# Patient Record
Sex: FEMALE | Race: WHITE | NOT HISPANIC OR LATINO | URBAN - METROPOLITAN AREA
[De-identification: names, ages, dates, MRNs, and addresses within clinical notes are randomized per-mention and may not be internally consistent; named-entity substitution may affect disease eponyms.]

---

## 2017-01-26 ENCOUNTER — INPATIENT (INPATIENT)
Facility: HOSPITAL | Age: 82
LOS: 1 days | Discharge: HOME | DRG: 247 | End: 2017-01-28
Attending: INTERNAL MEDICINE | Admitting: INTERNAL MEDICINE
Payer: MEDICARE

## 2017-01-26 VITALS
SYSTOLIC BLOOD PRESSURE: 134 MMHG | DIASTOLIC BLOOD PRESSURE: 75 MMHG | HEIGHT: 66 IN | WEIGHT: 169.98 LBS | RESPIRATION RATE: 18 BRPM | HEART RATE: 80 BPM | TEMPERATURE: 98 F | OXYGEN SATURATION: 99 %

## 2017-01-26 DIAGNOSIS — I21.11 ST ELEVATION (STEMI) MYOCARDIAL INFARCTION INVOLVING RIGHT CORONARY ARTERY: ICD-10-CM

## 2017-01-26 DIAGNOSIS — K21.9 GASTRO-ESOPHAGEAL REFLUX DISEASE WITHOUT ESOPHAGITIS: ICD-10-CM

## 2017-01-26 DIAGNOSIS — Z96.659 PRESENCE OF UNSPECIFIED ARTIFICIAL KNEE JOINT: Chronic | ICD-10-CM

## 2017-01-26 DIAGNOSIS — I21.3 ST ELEVATION (STEMI) MYOCARDIAL INFARCTION OF UNSPECIFIED SITE: ICD-10-CM

## 2017-01-26 LAB
ALBUMIN SERPL ELPH-MCNC: 4.3 G/DL — SIGNIFICANT CHANGE UP (ref 3.3–5.2)
ALP SERPL-CCNC: 52 U/L — SIGNIFICANT CHANGE UP (ref 40–120)
ALT FLD-CCNC: 18 U/L — SIGNIFICANT CHANGE UP
ANION GAP SERPL CALC-SCNC: 14 MMOL/L — SIGNIFICANT CHANGE UP (ref 5–17)
APTT BLD: 30.6 SEC — SIGNIFICANT CHANGE UP (ref 27.5–37.4)
AST SERPL-CCNC: 24 U/L — SIGNIFICANT CHANGE UP
BASOPHILS # BLD AUTO: 0 K/UL — SIGNIFICANT CHANGE UP (ref 0–0.2)
BASOPHILS NFR BLD AUTO: 0.4 % — SIGNIFICANT CHANGE UP (ref 0–2)
BILIRUB SERPL-MCNC: 0.6 MG/DL — SIGNIFICANT CHANGE UP (ref 0.4–2)
BLD GP AB SCN SERPL QL: SIGNIFICANT CHANGE UP
BUN SERPL-MCNC: 22 MG/DL — HIGH (ref 8–20)
CALCIUM SERPL-MCNC: 9.4 MG/DL — SIGNIFICANT CHANGE UP (ref 8.6–10.2)
CHLORIDE SERPL-SCNC: 102 MMOL/L — SIGNIFICANT CHANGE UP (ref 98–107)
CO2 SERPL-SCNC: 24 MMOL/L — SIGNIFICANT CHANGE UP (ref 22–29)
CREAT SERPL-MCNC: 0.98 MG/DL — SIGNIFICANT CHANGE UP (ref 0.5–1.3)
EOSINOPHIL # BLD AUTO: 0 K/UL — SIGNIFICANT CHANGE UP (ref 0–0.5)
EOSINOPHIL NFR BLD AUTO: 0.5 % — SIGNIFICANT CHANGE UP (ref 0–6)
GLUCOSE SERPL-MCNC: 134 MG/DL — HIGH (ref 70–115)
HCT VFR BLD CALC: 40.9 % — SIGNIFICANT CHANGE UP (ref 37–47)
HGB BLD-MCNC: 14 G/DL — SIGNIFICANT CHANGE UP (ref 12–16)
INR BLD: 1.13 RATIO — SIGNIFICANT CHANGE UP (ref 0.88–1.16)
LYMPHOCYTES # BLD AUTO: 1.6 K/UL — SIGNIFICANT CHANGE UP (ref 1–4.8)
LYMPHOCYTES # BLD AUTO: 19.8 % — LOW (ref 20–55)
MCHC RBC-ENTMCNC: 33.2 PG — HIGH (ref 27–31)
MCHC RBC-ENTMCNC: 34.2 G/DL — SIGNIFICANT CHANGE UP (ref 32–36)
MCV RBC AUTO: 96.9 FL — SIGNIFICANT CHANGE UP (ref 81–99)
MONOCYTES # BLD AUTO: 0.5 K/UL — SIGNIFICANT CHANGE UP (ref 0–0.8)
MONOCYTES NFR BLD AUTO: 6.5 % — SIGNIFICANT CHANGE UP (ref 3–10)
NEUTROPHILS # BLD AUTO: 5.7 K/UL — SIGNIFICANT CHANGE UP (ref 1.8–8)
NEUTROPHILS NFR BLD AUTO: 72.7 % — SIGNIFICANT CHANGE UP (ref 37–73)
PLATELET # BLD AUTO: 273 K/UL — SIGNIFICANT CHANGE UP (ref 150–400)
POTASSIUM SERPL-MCNC: 4.2 MMOL/L — SIGNIFICANT CHANGE UP (ref 3.5–5.3)
POTASSIUM SERPL-SCNC: 4.2 MMOL/L — SIGNIFICANT CHANGE UP (ref 3.5–5.3)
PROT SERPL-MCNC: 7.4 G/DL — SIGNIFICANT CHANGE UP (ref 6.6–8.7)
PROTHROM AB SERPL-ACNC: 12.4 SEC — SIGNIFICANT CHANGE UP (ref 10–13.1)
RBC # BLD: 4.22 M/UL — LOW (ref 4.4–5.2)
RBC # FLD: 13.3 % — SIGNIFICANT CHANGE UP (ref 11–15.6)
SODIUM SERPL-SCNC: 140 MMOL/L — SIGNIFICANT CHANGE UP (ref 135–145)
TROPONIN T SERPL-MCNC: 0.2 NG/ML — CRITICAL HIGH (ref 0–0.06)
TYPE + AB SCN PNL BLD: SIGNIFICANT CHANGE UP
WBC # BLD: 7.88 K/UL — SIGNIFICANT CHANGE UP (ref 4.8–10.8)
WBC # FLD AUTO: 7.88 K/UL — SIGNIFICANT CHANGE UP (ref 4.8–10.8)

## 2017-01-26 PROCEDURE — 99291 CRITICAL CARE FIRST HOUR: CPT

## 2017-01-26 PROCEDURE — 93010 ELECTROCARDIOGRAM REPORT: CPT

## 2017-01-26 PROCEDURE — 71010: CPT | Mod: 26

## 2017-01-26 RX ORDER — ASPIRIN/CALCIUM CARB/MAGNESIUM 324 MG
81 TABLET ORAL DAILY
Qty: 0 | Refills: 0 | Status: DISCONTINUED | OUTPATIENT
Start: 2017-01-26 | End: 2017-01-28

## 2017-01-26 RX ORDER — ATORVASTATIN CALCIUM 80 MG/1
40 TABLET, FILM COATED ORAL AT BEDTIME
Qty: 0 | Refills: 0 | Status: DISCONTINUED | OUTPATIENT
Start: 2017-01-26 | End: 2017-01-28

## 2017-01-26 RX ORDER — TICAGRELOR 90 MG/1
90 TABLET ORAL
Qty: 0 | Refills: 0 | Status: DISCONTINUED | OUTPATIENT
Start: 2017-01-27 | End: 2017-01-28

## 2017-01-26 RX ORDER — PANTOPRAZOLE SODIUM 20 MG/1
40 TABLET, DELAYED RELEASE ORAL
Qty: 0 | Refills: 0 | Status: DISCONTINUED | OUTPATIENT
Start: 2017-01-26 | End: 2017-01-28

## 2017-01-26 RX ORDER — METOPROLOL TARTRATE 50 MG
25 TABLET ORAL
Qty: 0 | Refills: 0 | Status: DISCONTINUED | OUTPATIENT
Start: 2017-01-26 | End: 2017-01-28

## 2017-01-26 RX ORDER — ONDANSETRON 8 MG/1
4 TABLET, FILM COATED ORAL ONCE
Qty: 0 | Refills: 0 | Status: COMPLETED | OUTPATIENT
Start: 2017-01-26 | End: 2017-01-26

## 2017-01-26 RX ADMIN — Medication 25 MILLIGRAM(S): at 18:41

## 2017-01-26 RX ADMIN — ONDANSETRON 4 MILLIGRAM(S): 8 TABLET, FILM COATED ORAL at 17:02

## 2017-01-26 RX ADMIN — Medication 81 MILLIGRAM(S): at 18:41

## 2017-01-26 RX ADMIN — ATORVASTATIN CALCIUM 40 MILLIGRAM(S): 80 TABLET, FILM COATED ORAL at 22:37

## 2017-01-26 RX ADMIN — PANTOPRAZOLE SODIUM 40 MILLIGRAM(S): 20 TABLET, DELAYED RELEASE ORAL at 18:41

## 2017-01-26 NOTE — H&P ADULT. - RS GEN PE MLT RESP DETAILS PC
clear to auscultation bilaterally/diminished breath sounds, R/no wheezes/diminished breath sounds, L

## 2017-01-26 NOTE — ED PROVIDER NOTE - CRITICAL CARE PROVIDED
documentation/interpretation of diagnostic studies/consultation with other physicians/direct patient care (not related to procedure)/additional history taking

## 2017-01-26 NOTE — H&P CARDIOLOGY - HISTORY OF PRESENT ILLNESS
This is an 82 y/o female with no significant medical history whom c/o chest pain that began at 930 this am.  She eventually went to an urgent care center because of the pain who sent her to [facility] ER.  Upon arrival patient EKG revealed IWMI and patient brought up to CCL for emergent LHC and possible PCI.    PMD--Dr. José

## 2017-01-26 NOTE — ED ADULT NURSE REASSESSMENT NOTE - NS ED NURSE REASSESS COMMENT FT1
dr whiting at bedside pt aa and o x3 c/o pain to chest x few days, per ems pt given asa and 2 nitro at walkin clinic
pt transported to cardiac cath at this time, a/o x 3, b/l 20 g in b/l in AC's, MD and RN's accompanying patient, vitals are as charted, belongings with patient

## 2017-01-26 NOTE — H&P ADULT. - HISTORY OF PRESENT ILLNESS
81F with history of GERD. Brought to ER with chest pain since this morning. FOund to have Inferior wall EKG changes. Brought to the cardiac cath lab for urgent cath, which revealed an occluded RCA. This was treated with a DEONDRE x 1. The EF was noted to be about 45% with inferior wall akinesis.

## 2017-01-26 NOTE — DISCHARGE NOTE ADULT - MEDICATION SUMMARY - MEDICATIONS TO TAKE
I will START or STAY ON the medications listed below when I get home from the hospital:    aspirin 81 mg oral tablet, chewable  -- 1 tab(s) by mouth once a day  -- Indication: For CAD S/P percutaneous coronary angioplasty    atorvastatin 40 mg oral tablet  -- 1 tab(s) by mouth once a day (at bedtime)  -- Indication: For CAD S/P percutaneous coronary angioplasty    ticagrelor 90 mg oral tablet  -- 1 tab(s) by mouth 2 times a day  -- Indication: For CAD S/P percutaneous coronary angioplasty    metoprolol tartrate 25 mg oral tablet  -- 1 tab(s) by mouth 2 times a day  -- Indication: For CAD S/P percutaneous coronary angioplasty    omeprazole 20 mg oral delayed release capsule  -- 1 cap(s) by mouth once a day  -- Indication: For Gastroesophageal reflux disease, esophagitis presence not specified

## 2017-01-26 NOTE — H&P ADULT. - ASSESSMENT
Impression/Plan:    1) Inferior wall MI, involving the RCA: S/P PCI to RCA. Will start ASA/Brilinta, as well as Statin, Beta blocker and ACE. WIll Check Troponin and F/U Echo. Cardiology F/U     2) GERD: WIll start Diet and give protonix     I spent a  total of 35 mins evaluating and treating this critical patient

## 2017-01-26 NOTE — DISCHARGE NOTE ADULT - CARE PROVIDER_API CALL
Triston Mejia), Cardiovascular Disease; Interventional Cardiology  [address] [place] [address]  Phone: [phone]  Fax: [phone] Triston Mejia), Cardiovascular Disease; Interventional Cardiology  [address] [place] [address]  Phone: [phone]  Fax: [phone]    simin MCNAIR,   Phone: (   )    -  Fax: (   )    -

## 2017-01-26 NOTE — DISCHARGE NOTE ADULT - NS AS ACTIVITY OBS
No Heavy lifting/straining/Stairs allowed/Walking-Outdoors allowed/Showering allowed/Walking-Indoors allowed

## 2017-01-26 NOTE — ED PROVIDER NOTE - CONSTITUTIONAL, MLM
normal... , awake, alert, oriented to person, place, time/situation , + diaphoresis, in mild distress

## 2017-01-26 NOTE — PROGRESS NOTE ADULT - SUBJECTIVE AND OBJECTIVE BOX
Cardiology NP note:     -See H & P    -s/p STEMI/IWMI and DEONDRE x1 to dRCA; EF ~ 45% with inferior hypokinesis  -RFA angioseal site benign without hematoma/bleeding; no bruit; + right PP  -Post PCI EKG: NSR 84 bpm inferior ST elevations improving  -/77 Tele HR 83 RR 12 Pox 98% O2 2LNC    A/P: 82 y/o female with significant past medical history who came to ER today c/o chest pain--STEMI/IWMI now s/p DEONDRE x 1 to dRCA with inferior hypokinesis on cath.  -Admit to ICU--Dr. Hutchinson service  -Meds: ADD baby ASA/brilinta/beta blocker/statin             Continue PPI for GI prohylaxis             Consider adding ACEI if BP remains stable  -Benefits of ASA/Brilinta emphasized  -Lifestyle mods/diet/activity/meds discussed with patient verbal understanding  -Additonal plan as per Attending MD and ICU staff

## 2017-01-26 NOTE — ED PROVIDER NOTE - MEDICAL DECISION MAKING DETAILS
spoke with interventional cardiology sagree to emergenct cath she has asa and nitro at urgent [facility] center. pt pain controlled multip bedside hemodynamic reassessments. do not suspect aortic dissection. admitted to cath lab for emergent cath for acute MI

## 2017-01-26 NOTE — DISCHARGE NOTE ADULT - PROVIDER TOKENS
AIRAM:'[id number]' TOKEN:'[id number]',FREE:[LAST:[simin PMD],PHONE:[(   )    -],FAX:[(   )    -]]

## 2017-01-26 NOTE — H&P ADULT. - ATTENDING COMMENTS
I saw this patient at 0910 on 1/27 and agree with the above.  Her chest discomfort ("not pain!") is gone, denies dyspnea.  Hemodynamics are stable.  Her cath site in the R inguinal area is intact.  OK for transfer from ICU.

## 2017-01-26 NOTE — DISCHARGE NOTE ADULT - PLAN OF CARE
Remain free of worsening CAD No heavy lifting, driving, sex, tub baths, swimming, or any activity that submerges the lower half of the body in water for 48 hours.  Limited walking and stairs for 48 hours.    Change the bandaid after 24 hours and every 24 hours after that.  Keep the puncture site dry and covered with a bandaid until a scab forms.    Observe the site frequently.  If bleeding or a large lump (the size of a golf ball or bigger) occurs lie flat, apply continuous direct pressure just above the puncture site for at least 10 minutes, and notify your physician immediately.  If the bleeding cannot be controlled, call 911 immediately for assistance.  Notify your physician of pain, swelling or any drainage.    Notify your physician immediately if coldness, numbness, discoloration or pain in your foot occurs.  Follow up with Dr. Mejia in one to two weeks.

## 2017-01-26 NOTE — CONSULT NOTE ADULT - SUBJECTIVE AND OBJECTIVE BOX
Patient is a 81y old  Female who presents with a chief complaint of Chest Pain (2017 17:25)      HPI:  81F with history of GERD. Brought to ER with chest pain since this morning. Found to have acute Inferior wall MI.  Chest pain intermittent for the past 2 weeks.  Today chest pain started at 9AM.      PAST MEDICAL & SURGICAL HISTORY:  GERD (gastroesophageal reflux disease)  Osteoporosis  S/P knee replacement: left    Allergies    Duricef (Unknown)    Intolerances        MEDICATIONS  (STANDING):  ondansetron Injectable 4milliGRAM(s) IV Push once  aspirin  chewable 81milliGRAM(s) Chew daily  atorvastatin 40milliGRAM(s) Oral at bedtime  metoprolol 25milliGRAM(s) Oral two times a day  pantoprazole    Tablet 40milliGRAM(s) Oral before breakfast    MEDICATIONS  (PRN):    ondansetron Injectable 4milliGRAM(s) IV Push once  aspirin  chewable 81milliGRAM(s) Chew daily  atorvastatin 40milliGRAM(s) Oral at bedtime  metoprolol 25milliGRAM(s) Oral two times a day  pantoprazole    Tablet 40milliGRAM(s) Oral before breakfast      FAMILY HISTORY:  Family history of stomach cancer (Father, Mother)  CAD (coronary artery disease) (Sibling)      SOCIAL HISTORY:    CIGARETTES: None    ALCOHOL: None    REVIEW OF SYSTEMS:  CONSTITUTIONAL: No fever, weight loss, or fatigue  EYES: No eye pain, visual disturbances, or discharge  ENMT:  No difficulty hearing, tinnitus, vertigo; No sinus or throat pain  NECK: No pain or stiffness  RESPIRATORY: No cough, wheezing, chills or hemoptysis; No Shortness of Breath  CARDIOVASCULAR: No chest pain, palpitations, passing out, dizziness, or leg swelling  GASTROINTESTINAL: No abdominal or epigastric pain. No nausea, vomiting, or hematemesis; No diarrhea or constipation. No melena or hematochezia.  GENITOURINARY: No dysuria, frequency, hematuria, or incontinence  NEUROLOGICAL: No headaches, memory loss, loss of strength, numbness, or tremors  SKIN: No itching, burning, rashes, or lesions   LYMPH Nodes: No enlarged glands  ENDOCRINE: No heat or cold intolerance; No hair loss  MUSCULOSKELETAL: No joint pain or swelling; No muscle, back, or extremity pain  PSYCHIATRIC: No depression, anxiety, mood swings, or difficulty sleeping  HEME/LYMPH: No easy bruising, or bleeding gums  ALLERY AND IMMUNOLOGIC: No hives or eczema	    Vital Signs Last 24 Hrs  T(C): 36, Max: 36.6 ( @ 16:01)  T(F): 96.8, Max: 97.8 ( @ 16:01)  HR: 90 (74 - 90)  BP: 128/90 (128/90 - 143/62)  BP(mean): 102 (89 - 102)  RR: 16 (12 - 20)  SpO2: 96% (96% - 99%)    Daily Height in cm: 167 (2017 16:48)    Daily Weight in k (2017 16:32)    I&O's Detail      PHYSICAL EXAM:  Appearance: Normal, well nourished	  HEENT:   Normal oral mucosa, PERRL, EOMI, sclera non-icteric	  Lymphatic: No cervical lymphadenopathy  Cardiovascular: Normal S1 S2, No JVD, No cardiac murmurs, No carotid bruits, No peripheral edema  Respiratory: Lungs clear to auscultation	  Psychiatry: A & O x 3, Mood & affect appropriate  Gastrointestinal:  Soft, Non-tender, + BS, no bruits	  Skin: No rashes, No ecchymoses, No cyanosis  Neurologic: Grossly non-focal with full strength in all four extremities  Extremities: Normal range of motion, No clubbing, cyanosis or edema  Vascular: Peripheral pulses palpable 2+ bilaterally      INTERPRETATION OF TELEMETRY:    ECG: NSR.  Acute inferior wall MI    LABS:                        14.0   7.88  )-----------( 273      ( 2017 16:12 )             40.9     2017 16:12    140    |  102    |  22.0   ----------------------------<  134    4.2     |  24.0   |  0.98     Ca    9.4        2017 16:12    TPro  7.4    /  Alb  4.3    /  TBili  0.6    /  DBili  x      /  AST  24     /  ALT  18     /  AlkPhos  52     2017 16:12    CARDIAC MARKERS ( 2017 16:12 )  x     / 0.20 ng/mL / x     / x     / x          PT/INR - ( 2017 16:12 )   PT: 12.4 sec;   INR: 1.13 ratio         PTT - ( 2017 16:12 )  PTT:30.6 sec    I&O's Summary    Assessment:    81F with history of GERD presenting with an acute inferior wall MI      Plan:  Emergent cardiac catheterization.  Cardiac catheterization and possible percutaneous intervention recommended.  Risks, benefits, and alternatives reviewed.  Risks including but not limited to MI, death, stroke, bleeding, infection, vessel injury, hematoma, renal failure, allergic reaction, urgent open heart surgery, restenosis and stent thrombosis were reviewed.  All questions answered.  Patient is agreeable to proceed.

## 2017-01-26 NOTE — ED ADULT NURSE NOTE - OBJECTIVE STATEMENT
pt received in er ambulance triage. pt is awake alert oriented following commands and speaking coherently. MD whiting and code team at bedside. pt was sent in from [facility] for abnormal ekg and chest pain. pt received 2 nitro and aspirin pta. pt states she has had chest pain x 4 days with no radiation. pt states today while at [facility] it became worse. pt denies any nausea vomiting fever chills sob abdominal pain dizziness headache and urinary problems. pt is non toxic appearing. pt is a code stemi. denies any cardiac hx

## 2017-01-26 NOTE — DISCHARGE NOTE ADULT - CARE PLAN
Principal Discharge DX:	ST elevation myocardial infarction involving right coronary artery  Goal:	Remain free of worsening CAD  Instructions for follow-up, activity and diet:	No heavy lifting, driving, sex, tub baths, swimming, or any activity that submerges the lower half of the body in water for 48 hours.  Limited walking and stairs for 48 hours.    Change the bandaid after 24 hours and every 24 hours after that.  Keep the puncture site dry and covered with a bandaid until a scab forms.    Observe the site frequently.  If bleeding or a large lump (the size of a golf ball or bigger) occurs lie flat, apply continuous direct pressure just above the puncture site for at least 10 minutes, and notify your physician immediately.  If the bleeding cannot be controlled, call 911 immediately for assistance.  Notify your physician of pain, swelling or any drainage.    Notify your physician immediately if coldness, numbness, discoloration or pain in your foot occurs.  Follow up with Dr. Mejia in one to two weeks. Principal Discharge DX:	ST elevation myocardial infarction involving right coronary artery  Goal:	Remain free of worsening CAD  Instructions for follow-up, activity and diet:	No heavy lifting, driving, sex, tub baths, swimming, or any activity that submerges the lower half of the body in water for 48 hours.  Limited walking and stairs for 48 hours.    Change the bandaid after 24 hours and every 24 hours after that.  Keep the puncture site dry and covered with a bandaid until a scab forms.    Observe the site frequently.  If bleeding or a large lump (the size of a golf ball or bigger) occurs lie flat, apply continuous direct pressure just above the puncture site for at least 10 minutes, and notify your physician immediately.  If the bleeding cannot be controlled, call 911 immediately for assistance.  Notify your physician of pain, swelling or any drainage.    Notify your physician immediately if coldness, numbness, discoloration or pain in your foot occurs.  Follow up with Dr. Mejia in one to two weeks.  Secondary Diagnosis:	Gastroesophageal reflux disease, esophagitis presence not specified  Secondary Diagnosis:	Osteoporosis

## 2017-01-26 NOTE — H&P CARDIOLOGY - FAMILY HISTORY
Sibling  Still living? Unknown  CAD (coronary artery disease), Age at diagnosis: 61-70     Father  Still living? Unknown  Family history of stomach cancer, Age at diagnosis: Age Unknown     Mother  Still living? Unknown  Family history of stomach cancer, Age at diagnosis: Age Unknown

## 2017-01-26 NOTE — DISCHARGE NOTE ADULT - HOSPITAL COURSE
patient admitted with chest pain yesterday, noted to have IWMI, was taken to cardiac cath lab urgently, underwent PCI of RCA. now stable.    LM: Normal       LAD: 40% ostial D1 stenosis, 85% proximal stenosis       LCX: 30% mid stenosis       RCA: 99% distal stenosis treated with a Resolute DEODNRE       LV: EF 45%      Vital Signs Last 24 Hrs  T(C): 36.4, Max: 37 (01-28 @ 06:21)  T(F): 97.6, Max: 98.6 (01-28 @ 06:21)  HR: 72 (72 - 88)  BP: 122/68 (110/60 - 124/60)  BP(mean): 81 (80 - 81)  RR: 15 (15 - 34)  SpO2: 98% (95% - 98%)    ACCUCHECKS    PHYSICAL EXAM-  GENERAL: NAD, well-groomed, well-developed  HEAD:  Atraumatic, Normocephalic  EYES: EOMI, PERRLA, conjunctiva and sclera clear  ENMT: No tonsillar erythema, exudates, or enlargement; Moist mucous membranes, Good dentition, No lesions  NECK: Supple, No JVD, Normal thyroid  NERVOUS SYSTEM:  Alert & Oriented X3, Motor Strength 5/5 B/L upper and lower extremities; DTRs 2+ intact and symmetric  CHEST/LUNG: Clear to percussion bilaterally; No rales, rhonchi, wheezing, or rubs  HEART: Regular rate and rhythm; No murmurs, rubs, or gallops  ABDOMEN: Soft, Nontender, Nondistended; Bowel sounds present  EXTREMITIES:  2+ Peripheral Pulses, No clubbing, cyanosis, or edema  LYMPH: No lymphadenopathy noted  SKIN: No rashes or lesions    LABS:                        12.6   6.21  )-----------( 217      ( 28 Jan 2017 06:07 )             37.1     28 Jan 2017 06:07    139    |  106    |  19.0   ----------------------------<  92     3.8     |  23.0   |  0.97     Ca    8.6        28 Jan 2017 06:07  Mg     1.9       28 Jan 2017 06:07    TPro  7.4    /  Alb  4.3    /  TBili  0.6    /  DBili  x      /  AST  24     /  ALT  18     /  AlkPhos  52     26 Jan 2017 16:12    PT/INR - ( 26 Jan 2017 16:12 )   PT: 12.4 sec;   INR: 1.13 ratio         PTT - ( 26 Jan 2017 16:12 )  PTT:30.6 sec    Medically stable and agreeable with discharge and follow up plan. Patient was advised to return to ED if any symptoms occur or worsen.

## 2017-01-26 NOTE — DISCHARGE NOTE ADULT - PATIENT PORTAL LINK FT
“You can access the [facility] Patient Portal, offered by [facility], by registering with the following website: [email or web address]”

## 2017-01-26 NOTE — DISCHARGE NOTE ADULT - ABILITY TO HEAR (WITH HEARING AID OR HEARING APPLIANCE IF NORMALLY USED):
Adequate: hears normal conversation without difficulty Mildly to Moderately Impaired: difficulty hearing in some environments or speaker may need to increase volume or speak distinctly

## 2017-01-26 NOTE — ED ADULT NURSE NOTE - CHIEF COMPLAINT QUOTE
Patient arrived via ambulance, awake, alert and oriented times 3, breathing unlabored.  Patient complaining of intermittent chest pain across top of chest radiating to back.  Patient states pain started 4 days ago.  No SOB.  No N/V.  [facility] and [facility] prenotification of code stemi.   Code team and Dr. Son at bedside on arrival

## 2017-01-26 NOTE — ED PROVIDER NOTE - OBJECTIVE STATEMENT
pt presents with severe substernal chest pain worsening over last three days substernal achy no radiation. denies fever. denies HA or neck pain. no sob. no abd pain. no n/v/d. no urinary f/u/d. no back pain. no motor or sensory deficits. denies drug use. no recent travel. no rash. no other acute issues symptoms or concerns

## 2017-01-27 DIAGNOSIS — I25.10 ATHEROSCLEROTIC HEART DISEASE OF NATIVE CORONARY ARTERY WITHOUT ANGINA PECTORIS: ICD-10-CM

## 2017-01-27 LAB
ANION GAP SERPL CALC-SCNC: 12 MMOL/L — SIGNIFICANT CHANGE UP (ref 5–17)
BASOPHILS # BLD AUTO: 0 K/UL — SIGNIFICANT CHANGE UP (ref 0–0.2)
BASOPHILS NFR BLD AUTO: 0.1 % — SIGNIFICANT CHANGE UP (ref 0–2)
BUN SERPL-MCNC: 16 MG/DL — SIGNIFICANT CHANGE UP (ref 8–20)
CALCIUM SERPL-MCNC: 8.2 MG/DL — LOW (ref 8.6–10.2)
CHLORIDE SERPL-SCNC: 104 MMOL/L — SIGNIFICANT CHANGE UP (ref 98–107)
CO2 SERPL-SCNC: 23 MMOL/L — SIGNIFICANT CHANGE UP (ref 22–29)
CREAT SERPL-MCNC: 0.86 MG/DL — SIGNIFICANT CHANGE UP (ref 0.5–1.3)
EOSINOPHIL # BLD AUTO: 0 K/UL — SIGNIFICANT CHANGE UP (ref 0–0.5)
EOSINOPHIL NFR BLD AUTO: 0.6 % — SIGNIFICANT CHANGE UP (ref 0–6)
GLUCOSE SERPL-MCNC: 97 MG/DL — SIGNIFICANT CHANGE UP (ref 70–115)
HCT VFR BLD CALC: 37.1 % — SIGNIFICANT CHANGE UP (ref 37–47)
HGB BLD-MCNC: 12.6 G/DL — SIGNIFICANT CHANGE UP (ref 12–16)
LYMPHOCYTES # BLD AUTO: 1.7 K/UL — SIGNIFICANT CHANGE UP (ref 1–4.8)
LYMPHOCYTES # BLD AUTO: 24.3 % — SIGNIFICANT CHANGE UP (ref 20–55)
MCHC RBC-ENTMCNC: 32.8 PG — HIGH (ref 27–31)
MCHC RBC-ENTMCNC: 34 G/DL — SIGNIFICANT CHANGE UP (ref 32–36)
MCV RBC AUTO: 96.6 FL — SIGNIFICANT CHANGE UP (ref 81–99)
MONOCYTES # BLD AUTO: 0.8 K/UL — SIGNIFICANT CHANGE UP (ref 0–0.8)
MONOCYTES NFR BLD AUTO: 12.2 % — HIGH (ref 3–10)
NEUTROPHILS # BLD AUTO: 4.3 K/UL — SIGNIFICANT CHANGE UP (ref 1.8–8)
NEUTROPHILS NFR BLD AUTO: 62.7 % — SIGNIFICANT CHANGE UP (ref 37–73)
PLATELET # BLD AUTO: 236 K/UL — SIGNIFICANT CHANGE UP (ref 150–400)
POTASSIUM SERPL-MCNC: 3.8 MMOL/L — SIGNIFICANT CHANGE UP (ref 3.5–5.3)
POTASSIUM SERPL-SCNC: 3.8 MMOL/L — SIGNIFICANT CHANGE UP (ref 3.5–5.3)
RBC # BLD: 3.84 M/UL — LOW (ref 4.4–5.2)
RBC # FLD: 13.3 % — SIGNIFICANT CHANGE UP (ref 11–15.6)
SODIUM SERPL-SCNC: 139 MMOL/L — SIGNIFICANT CHANGE UP (ref 135–145)
WBC # BLD: 6.86 K/UL — SIGNIFICANT CHANGE UP (ref 4.8–10.8)
WBC # FLD AUTO: 6.86 K/UL — SIGNIFICANT CHANGE UP (ref 4.8–10.8)

## 2017-01-27 PROCEDURE — 99233 SBSQ HOSP IP/OBS HIGH 50: CPT

## 2017-01-27 PROCEDURE — 99223 1ST HOSP IP/OBS HIGH 75: CPT

## 2017-01-27 RX ADMIN — Medication 25 MILLIGRAM(S): at 17:56

## 2017-01-27 RX ADMIN — Medication 81 MILLIGRAM(S): at 11:21

## 2017-01-27 RX ADMIN — TICAGRELOR 90 MILLIGRAM(S): 90 TABLET ORAL at 17:55

## 2017-01-27 RX ADMIN — ATORVASTATIN CALCIUM 40 MILLIGRAM(S): 80 TABLET, FILM COATED ORAL at 21:15

## 2017-01-27 RX ADMIN — TICAGRELOR 90 MILLIGRAM(S): 90 TABLET ORAL at 05:04

## 2017-01-27 RX ADMIN — PANTOPRAZOLE SODIUM 40 MILLIGRAM(S): 20 TABLET, DELAYED RELEASE ORAL at 05:04

## 2017-01-27 RX ADMIN — Medication 25 MILLIGRAM(S): at 05:04

## 2017-01-27 NOTE — PROGRESS NOTE ADULT - SUBJECTIVE AND OBJECTIVE BOX
INTERVAL HPI/OVERNIGHT EVENTS:      ICU ACCEPTANCE NOTE:    CC: STEMI    Chart and course reviewed, patient admitted with chest pain yesterday, noted to have IWMI, was taken to cardiac cath lab urgently, underwent PCI of RCA. Currently denies chest pain, says has no significant medical history prior to this episode.     Vital Signs Last 24 Hrs  T(C): 36.6, Max: 37.2 (01-27 @ 04:00)  T(F): 97.8, Max: 99 (01-27 @ 04:00)  HR: 80 (64 - 90)  BP: 106/53 (98/52 - 183/77)  BP(mean): 76 (71 - 113)  RR: 39 (12 - 42)  SpO2: 98% (92% - 100%)    PHYSICAL EXAM:    GENERAL: Not in distress  CHEST/LUNG: b/l air entry, clear  HEART: Regular   ABDOMEN: Soft, BS +  EXTREMITIES: No edema    MEDICATIONS  (STANDING):  aspirin  chewable 81milliGRAM(s) Chew daily  ticagrelor 90milliGRAM(s) Oral two times a day  atorvastatin 40milliGRAM(s) Oral at bedtime  metoprolol 25milliGRAM(s) Oral two times a day  pantoprazole    Tablet 40milliGRAM(s) Oral before breakfast    MEDICATIONS  (PRN):      Allergies    Duricef (Unknown)    Intolerances          LABS:                          12.6   6.86  )-----------( 236      ( 27 Jan 2017 04:57 )             37.1     27 Jan 2017 04:57    139    |  104    |  16.0   ----------------------------<  97     3.8     |  23.0   |  0.86     Ca    8.2        27 Jan 2017 04:57    TPro  7.4    /  Alb  4.3    /  TBili  0.6    /  DBili  x      /  AST  24     /  ALT  18     /  AlkPhos  52     26 Jan 2017 16:12    PT/INR - ( 26 Jan 2017 16:12 )   PT: 12.4 sec;   INR: 1.13 ratio         PTT - ( 26 Jan 2017 16:12 )  PTT:30.6 sec      RADIOLOGY & ADDITIONAL TESTS:

## 2017-01-27 NOTE — PROGRESS NOTE ADULT - ASSESSMENT
81 yr old female with no known medical history, except for GERD presented to urgent care center for chest pain, noted to have inferior wall MI and transferred to [facility]. She was taken emergently to [facility] and underwent PCI to RCA. Post procedure, she was admitted to the ICU. Her course in ICU was uneventful and is being transferred to medical floor.

## 2017-01-27 NOTE — PROGRESS NOTE ADULT - SUBJECTIVE AND OBJECTIVE BOX
INTERVAL HPI/OVERNIGHT EVENTS:  Feels "great".  No recurrent chest pain or shortness of breath    MEDICATIONS  (STANDING):  aspirin  chewable 81milliGRAM(s) Chew daily  ticagrelor 90milliGRAM(s) Oral two times a day  atorvastatin 40milliGRAM(s) Oral at bedtime  metoprolol 25milliGRAM(s) Oral two times a day  pantoprazole    Tablet 40milliGRAM(s) Oral before breakfast    MEDICATIONS  (PRN):      Allergies    Duricef (Unknown)    Intolerances      Vital Signs Last 24 Hrs  T(C): 36.8, Max: 37.2 (01-27 @ 04:00)  T(F): 98.3, Max: 99 (01-27 @ 04:00)  HR: 83 (66 - 88)  BP: 110/60 (95/52 - 124/60)  BP(mean): 81 (71 - 81)  RR: 17 (17 - 39)  SpO2: 98% (92% - 98%)    PHYSICAL EXAM:    Neck:  No JVD or bruit    Respiratory: CTA bilat    Cardiovascular: Normal S1 and S2 regular,no murmurs    Gastrointestinal: Soft NT    Extremities: Right groin without hematoma    Neurological: Alert and oriented and non focal.      LABS:                        12.6   6.86  )-----------( 236      ( 27 Jan 2017 04:57 )             37.1     27 Jan 2017 04:57    139    |  104    |  16.0   ----------------------------<  97     3.8     |  23.0   |  0.86     Ca    8.2        27 Jan 2017 04:57    TPro  7.4    /  Alb  4.3    /  TBili  0.6    /  DBili  x      /  AST  24     /  ALT  18     /  AlkPhos  52     26 Jan 2017 16:12    PT/INR - ( 26 Jan 2017 16:12 )   PT: 12.4 sec;   INR: 1.13 ratio         PTT - ( 26 Jan 2017 16:12 )  PTT:30.6 sec      RADIOLOGY & ADDITIONAL TESTS:  Echo with normal LV function and no valvular heart disease    Assessment:  S/P inferior wall MI treated with a tent to the RCA  Echo shows normal LV function  Continue Aspirin and Brilinta  Outpatient .fu within a week  Anticipate discharge 1/28/2017.

## 2017-01-27 NOTE — PROGRESS NOTE ADULT - PROBLEM SELECTOR PLAN 1
Continue aspirin, Brilinta, beta blocker and statin. Check ECHO, transfer to 4T. Needs repeat PCI in 2-4 weeks for D2.

## 2017-01-27 NOTE — PROGRESS NOTE ADULT - SUBJECTIVE AND OBJECTIVE BOX
Subjective:  81y  Female who had a left heart catheterization which showed:       LM: Normal       LAD: 40% ostial D1 stenosis, 85% proximal stenosis       LCX: 30% mid stenosis       RCA: 99% distal stenosis treated with a Resolute DEONDRE       LV: EF 45%    PAST MEDICAL & SURGICAL HISTORY:  GERD (gastroesophageal reflux disease)  Osteoporosis  S/P knee replacement: left    FAMILY HISTORY:  Family history of stomach cancer (Father, Mother)  CAD (coronary artery disease) (Sibling)    MEDICATIONS  (STANDING):  aspirin  chewable 81milliGRAM(s) Chew daily  ticagrelor 90milliGRAM(s) Oral two times a day  atorvastatin 40milliGRAM(s) Oral at bedtime  metoprolol 25milliGRAM(s) Oral two times a day  pantoprazole    Tablet 40milliGRAM(s) Oral before breakfast        Patient is a 81y old  Female who presents with a chief complaint of Chest Pain (26 Jan 2017 17:25)        HPI:  81F with history of GERD. Brought to ER with chest pain since this morning. FOund to have Inferior wall EKG changes. Brought to the cardiac cath lab for urgent cath, which revealed an occluded RCA. This was treated with a DEONDRE x 1. The EF was noted to be about 45% with inferior wall akinesis. (26 Jan 2017 16:48)    General: No fatigue, no fevers/chills  Respiratory: No dyspnea, no cough, no wheeze  CV: No chest pain, no palpitations  Abd: No nausea  Neuro: No headache, no dizziness  Duricef (Unknown)      Objective:  Vital Signs Last 24 Hrs  T(C): 36.6, Max: 37.2 (01-27 @ 04:00)  T(F): 97.8, Max: 99 (01-27 @ 04:00)  HR: 78 (64 - 90)  BP: 101/63 (98/52 - 183/77)  BP(mean): 77 (71 - 113)  RR: 34 (12 - 42)  SpO2: 95% (92% - 100%)  CM: SR  Neuro: A&OX3, CN 2-12 intact  HEENT: NC, AT  Lungs: CTA B/L  CV: S1, S2, no murmur, RRR  Abd: Soft  Right Groin: Soft, no bleeding, no hematoma  Extremity: + distal pulses  EKG: Pending                        12.6   6.86  )-----------( 236      ( 27 Jan 2017 04:57 )             37.1     27 Jan 2017 04:57    139    |  104    |  16.0   ----------------------------<  97     3.8     |  23.0   |  0.86     Ca    8.2        27 Jan 2017 04:57    TPro  7.4    /  Alb  4.3    /  TBili  0.6    /  DBili  x      /  AST  24     /  ALT  18     /  AlkPhos  52     26 Jan 2017 16:12    PT/INR - ( 26 Jan 2017 16:12 )   PT: 12.4 sec;   INR: 1.13 ratio         PTT - ( 26 Jan 2017 16:12 )  PTT:30.6 sec

## 2017-01-27 NOTE — PROGRESS NOTE ADULT - ASSESSMENT
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 Transfer to [address] [place] [address]    2. Follow up as an outpatient with: Dr. Mejia, will need PCI of D2 in 2-4 weeks    3. Post procedure teaching done including importance of medication adherence and access site care and monitoring.    4. DAPT: Brilinta 90mg BID and aspirin 81mg daily    5. Statin: Lipitor 40mg daily    6. Beta Blocker: Lopressor 25mg BID    7. ACEI/ARB: Will initiate if BP stable

## 2017-01-28 VITALS
HEART RATE: 80 BPM | RESPIRATION RATE: 14 BRPM | SYSTOLIC BLOOD PRESSURE: 120 MMHG | DIASTOLIC BLOOD PRESSURE: 80 MMHG | OXYGEN SATURATION: 98 %

## 2017-01-28 LAB
ANION GAP SERPL CALC-SCNC: 10 MMOL/L — SIGNIFICANT CHANGE UP (ref 5–17)
BUN SERPL-MCNC: 19 MG/DL — SIGNIFICANT CHANGE UP (ref 8–20)
CALCIUM SERPL-MCNC: 8.6 MG/DL — SIGNIFICANT CHANGE UP (ref 8.6–10.2)
CHLORIDE SERPL-SCNC: 106 MMOL/L — SIGNIFICANT CHANGE UP (ref 98–107)
CO2 SERPL-SCNC: 23 MMOL/L — SIGNIFICANT CHANGE UP (ref 22–29)
CREAT SERPL-MCNC: 0.97 MG/DL — SIGNIFICANT CHANGE UP (ref 0.5–1.3)
GLUCOSE SERPL-MCNC: 92 MG/DL — SIGNIFICANT CHANGE UP (ref 70–115)
HCT VFR BLD CALC: 37.1 % — SIGNIFICANT CHANGE UP (ref 37–47)
HGB BLD-MCNC: 12.6 G/DL — SIGNIFICANT CHANGE UP (ref 12–16)
MAGNESIUM SERPL-MCNC: 1.9 MG/DL — SIGNIFICANT CHANGE UP (ref 1.8–2.5)
MCHC RBC-ENTMCNC: 32.9 PG — HIGH (ref 27–31)
MCHC RBC-ENTMCNC: 34 G/DL — SIGNIFICANT CHANGE UP (ref 32–36)
MCV RBC AUTO: 96.9 FL — SIGNIFICANT CHANGE UP (ref 81–99)
PLATELET # BLD AUTO: 217 K/UL — SIGNIFICANT CHANGE UP (ref 150–400)
POTASSIUM SERPL-MCNC: 3.8 MMOL/L — SIGNIFICANT CHANGE UP (ref 3.5–5.3)
POTASSIUM SERPL-SCNC: 3.8 MMOL/L — SIGNIFICANT CHANGE UP (ref 3.5–5.3)
RBC # BLD: 3.83 M/UL — LOW (ref 4.4–5.2)
RBC # FLD: 13.4 % — SIGNIFICANT CHANGE UP (ref 11–15.6)
SODIUM SERPL-SCNC: 139 MMOL/L — SIGNIFICANT CHANGE UP (ref 135–145)
WBC # BLD: 6.21 K/UL — SIGNIFICANT CHANGE UP (ref 4.8–10.8)
WBC # FLD AUTO: 6.21 K/UL — SIGNIFICANT CHANGE UP (ref 4.8–10.8)

## 2017-01-28 PROCEDURE — 99239 HOSP IP/OBS DSCHRG MGMT >30: CPT

## 2017-01-28 PROCEDURE — C1887: CPT

## 2017-01-28 PROCEDURE — 71045 X-RAY EXAM CHEST 1 VIEW: CPT

## 2017-01-28 PROCEDURE — 83735 ASSAY OF MAGNESIUM: CPT

## 2017-01-28 PROCEDURE — 99285 EMERGENCY DEPT VISIT HI MDM: CPT

## 2017-01-28 PROCEDURE — C1874: CPT

## 2017-01-28 PROCEDURE — C1769: CPT

## 2017-01-28 PROCEDURE — C1760: CPT

## 2017-01-28 PROCEDURE — 86850 RBC ANTIBODY SCREEN: CPT

## 2017-01-28 PROCEDURE — 84484 ASSAY OF TROPONIN QUANT: CPT

## 2017-01-28 PROCEDURE — 80048 BASIC METABOLIC PNL TOTAL CA: CPT

## 2017-01-28 PROCEDURE — 86901 BLOOD TYPING SEROLOGIC RH(D): CPT

## 2017-01-28 PROCEDURE — 36415 COLL VENOUS BLD VENIPUNCTURE: CPT

## 2017-01-28 PROCEDURE — 93005 ELECTROCARDIOGRAM TRACING: CPT

## 2017-01-28 PROCEDURE — 86900 BLOOD TYPING SEROLOGIC ABO: CPT

## 2017-01-28 PROCEDURE — 85730 THROMBOPLASTIN TIME PARTIAL: CPT

## 2017-01-28 PROCEDURE — 93306 TTE W/DOPPLER COMPLETE: CPT

## 2017-01-28 PROCEDURE — 93458 L HRT ARTERY/VENTRICLE ANGIO: CPT | Mod: XU

## 2017-01-28 PROCEDURE — C1894: CPT

## 2017-01-28 PROCEDURE — 85610 PROTHROMBIN TIME: CPT

## 2017-01-28 PROCEDURE — 85027 COMPLETE CBC AUTOMATED: CPT

## 2017-01-28 PROCEDURE — C9607: CPT | Mod: RC

## 2017-01-28 PROCEDURE — 80053 COMPREHEN METABOLIC PANEL: CPT

## 2017-01-28 RX ORDER — ATORVASTATIN CALCIUM 80 MG/1
1 TABLET, FILM COATED ORAL
Qty: 30 | Refills: 0 | OUTPATIENT
Start: 2017-01-28

## 2017-01-28 RX ORDER — METOPROLOL TARTRATE 50 MG
1 TABLET ORAL
Qty: 60 | Refills: 0 | OUTPATIENT
Start: 2017-01-28

## 2017-01-28 RX ORDER — METOPROLOL TARTRATE 50 MG
1 TABLET ORAL
Qty: 60 | Refills: 0 | COMMUNITY
Start: 2017-01-28

## 2017-01-28 RX ORDER — TICAGRELOR 90 MG/1
1 TABLET ORAL
Qty: 60 | Refills: 0 | OUTPATIENT
Start: 2017-01-28

## 2017-01-28 RX ORDER — ASPIRIN/CALCIUM CARB/MAGNESIUM 324 MG
1 TABLET ORAL
Qty: 30 | Refills: 0 | OUTPATIENT
Start: 2017-01-28

## 2017-01-28 RX ADMIN — PANTOPRAZOLE SODIUM 40 MILLIGRAM(S): 20 TABLET, DELAYED RELEASE ORAL at 06:26

## 2017-01-28 RX ADMIN — Medication 81 MILLIGRAM(S): at 12:07

## 2017-01-28 RX ADMIN — Medication 25 MILLIGRAM(S): at 16:53

## 2017-01-28 RX ADMIN — Medication 25 MILLIGRAM(S): at 06:26

## 2017-01-28 RX ADMIN — TICAGRELOR 90 MILLIGRAM(S): 90 TABLET ORAL at 16:53

## 2017-01-28 RX ADMIN — TICAGRELOR 90 MILLIGRAM(S): 90 TABLET ORAL at 06:26

## 2017-01-28 NOTE — PROGRESS NOTE ADULT - ASSESSMENT
CAD: s/p Inferior wall MI, PCI with stent to RCA.   Asymptomatic and stable from a cardiovascular standpoint.   Continue current standing regimen as outpatient. Follow up with Dr. Mejia within 2 weeks.

## 2017-01-28 NOTE — PROGRESS NOTE ADULT - SUBJECTIVE AND OBJECTIVE BOX
patient states she feels well    MEDICATIONS  (STANDING):  aspirin  chewable 81milliGRAM(s) Chew daily  ticagrelor 90milliGRAM(s) Oral two times a day  atorvastatin 40milliGRAM(s) Oral at bedtime  metoprolol 25milliGRAM(s) Oral two times a day  pantoprazole    Tablet 40milliGRAM(s) Oral before breakfast      Vital Signs Last 24 Hrs  T(C): 36.8, Max: 37 (01-28 @ 06:21)  T(F): 98.2, Max: 98.6 (01-28 @ 06:21)  HR: 72 (72 - 88)  BP: 122/68 (110/60 - 124/60)  BP(mean): 81 (80 - 81)  RR: 15 (15 - 34)  SpO2: 98% (95% - 98%)      Physical exam:   Gen: no distress  CV: regular, S1S2, no murmur  Resp: Lungs CTA bilaterally  GI: Abd soft, NT    28 Jan 2017 06:07    139    |  106    |  19.0   ----------------------------<  92     3.8     |  23.0   |  0.97     Ca    8.6        28 Jan 2017 06:07  Mg     1.9       28 Jan 2017 06:07    TPro  7.4    /  Alb  4.3    /  TBili  0.6    /  DBili  x      /  AST  24     /  ALT  18     /  AlkPhos  52     26 Jan 2017 16:12                          12.6   6.21  )-----------( 217      ( 28 Jan 2017 06:07 )             37.1

## 2017-02-10 PROBLEM — K21.9 GASTRO-ESOPHAGEAL REFLUX DISEASE WITHOUT ESOPHAGITIS: Chronic | Status: ACTIVE | Noted: 2017-01-26

## 2017-02-10 PROBLEM — M81.0 AGE-RELATED OSTEOPOROSIS WITHOUT CURRENT PATHOLOGICAL FRACTURE: Chronic | Status: ACTIVE | Noted: 2017-01-26

## 2017-02-17 ENCOUNTER — OUTPATIENT (OUTPATIENT)
Dept: OUTPATIENT SERVICES | Facility: HOSPITAL | Age: 82
LOS: 1 days | End: 2017-02-17
Payer: MEDICARE

## 2017-02-17 VITALS
SYSTOLIC BLOOD PRESSURE: 155 MMHG | HEART RATE: 64 BPM | RESPIRATION RATE: 12 BRPM | WEIGHT: 171.96 LBS | OXYGEN SATURATION: 98 % | HEIGHT: 67 IN | DIASTOLIC BLOOD PRESSURE: 86 MMHG

## 2017-02-17 VITALS — WEIGHT: 171.96 LBS | HEIGHT: 67 IN

## 2017-02-17 DIAGNOSIS — Z95.5 PRESENCE OF CORONARY ANGIOPLASTY IMPLANT AND GRAFT: Chronic | ICD-10-CM

## 2017-02-17 DIAGNOSIS — Z01.810 ENCOUNTER FOR PREPROCEDURAL CARDIOVASCULAR EXAMINATION: ICD-10-CM

## 2017-02-17 DIAGNOSIS — Z96.659 PRESENCE OF UNSPECIFIED ARTIFICIAL KNEE JOINT: Chronic | ICD-10-CM

## 2017-02-17 LAB
ALBUMIN SERPL ELPH-MCNC: 4.2 G/DL — SIGNIFICANT CHANGE UP (ref 3.3–5.2)
ALP SERPL-CCNC: 59 U/L — SIGNIFICANT CHANGE UP (ref 40–120)
ALT FLD-CCNC: 21 U/L — SIGNIFICANT CHANGE UP
ANION GAP SERPL CALC-SCNC: 12 MMOL/L — SIGNIFICANT CHANGE UP (ref 5–17)
APTT BLD: 35.3 SEC — SIGNIFICANT CHANGE UP (ref 27.5–37.4)
AST SERPL-CCNC: 16 U/L — SIGNIFICANT CHANGE UP
BILIRUB SERPL-MCNC: 0.7 MG/DL — SIGNIFICANT CHANGE UP (ref 0.4–2)
BUN SERPL-MCNC: 24 MG/DL — HIGH (ref 8–20)
CALCIUM SERPL-MCNC: 9.7 MG/DL — SIGNIFICANT CHANGE UP (ref 8.6–10.2)
CHLORIDE SERPL-SCNC: 101 MMOL/L — SIGNIFICANT CHANGE UP (ref 98–107)
CO2 SERPL-SCNC: 24 MMOL/L — SIGNIFICANT CHANGE UP (ref 22–29)
CREAT SERPL-MCNC: 0.96 MG/DL — SIGNIFICANT CHANGE UP (ref 0.5–1.3)
GLUCOSE SERPL-MCNC: 94 MG/DL — SIGNIFICANT CHANGE UP (ref 70–115)
HCT VFR BLD CALC: 38.9 % — SIGNIFICANT CHANGE UP (ref 37–47)
HGB BLD-MCNC: 13.2 G/DL — SIGNIFICANT CHANGE UP (ref 12–16)
INR BLD: 1.15 RATIO — SIGNIFICANT CHANGE UP (ref 0.88–1.16)
MCHC RBC-ENTMCNC: 32.2 PG — HIGH (ref 27–31)
MCHC RBC-ENTMCNC: 33.9 G/DL — SIGNIFICANT CHANGE UP (ref 32–36)
MCV RBC AUTO: 94.9 FL — SIGNIFICANT CHANGE UP (ref 81–99)
PLATELET # BLD AUTO: 291 K/UL — SIGNIFICANT CHANGE UP (ref 150–400)
POTASSIUM SERPL-MCNC: 4.6 MMOL/L — SIGNIFICANT CHANGE UP (ref 3.5–5.3)
POTASSIUM SERPL-SCNC: 4.6 MMOL/L — SIGNIFICANT CHANGE UP (ref 3.5–5.3)
PROT SERPL-MCNC: 7.3 G/DL — SIGNIFICANT CHANGE UP (ref 6.6–8.7)
PROTHROM AB SERPL-ACNC: 12.7 SEC — SIGNIFICANT CHANGE UP (ref 10–13.1)
RBC # BLD: 4.1 M/UL — LOW (ref 4.4–5.2)
RBC # FLD: 12.7 % — SIGNIFICANT CHANGE UP (ref 11–15.6)
SODIUM SERPL-SCNC: 137 MMOL/L — SIGNIFICANT CHANGE UP (ref 135–145)
WBC # BLD: 5.9 K/UL — SIGNIFICANT CHANGE UP (ref 4.8–10.8)
WBC # FLD AUTO: 5.9 K/UL — SIGNIFICANT CHANGE UP (ref 4.8–10.8)

## 2017-02-17 PROCEDURE — 93005 ELECTROCARDIOGRAM TRACING: CPT

## 2017-02-17 PROCEDURE — 85730 THROMBOPLASTIN TIME PARTIAL: CPT

## 2017-02-17 PROCEDURE — 80053 COMPREHEN METABOLIC PANEL: CPT

## 2017-02-17 PROCEDURE — G0463: CPT

## 2017-02-17 PROCEDURE — 85027 COMPLETE CBC AUTOMATED: CPT

## 2017-02-17 PROCEDURE — 93010 ELECTROCARDIOGRAM REPORT: CPT

## 2017-02-17 PROCEDURE — 85610 PROTHROMBIN TIME: CPT

## 2017-02-17 NOTE — H&P CARDIOLOGY - PMH
CAD (coronary artery disease)    GERD (gastroesophageal reflux disease)    MI (myocardial infarction)  i stent placed  jan   Osteoporosis

## 2017-02-17 NOTE — H&P CARDIOLOGY - HISTORY OF PRESENT ILLNESS
This is an 80 y/o female with a past medical history of HPL and HTN who presented to [facility] with an acute inferior wall myocardial infarction on 1/26/17.  Her cardiac cath showed 99% stenosis of the distal RCA which was stented.  She also has a D2 lesion and STEWART.  Patient scheduled for staged PCI of this lesion.       PMD--Dr. José  Cardiologist--Dr. Mejia

## 2017-02-17 NOTE — ASU PATIENT PROFILE, ADULT - PSH
S/P knee replacement  left S/P knee replacement  left  Stented coronary artery  jan 260 2017   dr bland

## 2017-02-17 NOTE — ASU PATIENT PROFILE, ADULT - PMH
GERD (gastroesophageal reflux disease)    Osteoporosis CAD (coronary artery disease)    GERD (gastroesophageal reflux disease)    MI (myocardial infarction)  i stent placed  jan   Osteoporosis

## 2017-02-20 DIAGNOSIS — I25.10 ATHEROSCLEROTIC HEART DISEASE OF NATIVE CORONARY ARTERY WITHOUT ANGINA PECTORIS: ICD-10-CM

## 2017-02-20 DIAGNOSIS — Z01.810 ENCOUNTER FOR PREPROCEDURAL CARDIOVASCULAR EXAMINATION: ICD-10-CM

## 2017-02-23 ENCOUNTER — INPATIENT (INPATIENT)
Facility: HOSPITAL | Age: 82
LOS: 0 days | Discharge: HOME | DRG: 247 | End: 2017-02-24
Attending: INTERNAL MEDICINE | Admitting: INTERNAL MEDICINE
Payer: COMMERCIAL

## 2017-02-23 VITALS
SYSTOLIC BLOOD PRESSURE: 161 MMHG | RESPIRATION RATE: 18 BRPM | HEART RATE: 63 BPM | OXYGEN SATURATION: 98 % | TEMPERATURE: 98 F | DIASTOLIC BLOOD PRESSURE: 66 MMHG

## 2017-02-23 DIAGNOSIS — Z95.5 PRESENCE OF CORONARY ANGIOPLASTY IMPLANT AND GRAFT: Chronic | ICD-10-CM

## 2017-02-23 DIAGNOSIS — Z96.659 PRESENCE OF UNSPECIFIED ARTIFICIAL KNEE JOINT: Chronic | ICD-10-CM

## 2017-02-23 DIAGNOSIS — R94.39 ABNORMAL RESULT OF OTHER CARDIOVASCULAR FUNCTION STUDY: ICD-10-CM

## 2017-02-23 PROCEDURE — 93010 ELECTROCARDIOGRAM REPORT: CPT

## 2017-02-23 RX ORDER — SODIUM CHLORIDE 9 MG/ML
500 INJECTION INTRAMUSCULAR; INTRAVENOUS; SUBCUTANEOUS
Qty: 0 | Refills: 0 | Status: DISCONTINUED | OUTPATIENT
Start: 2017-02-23 | End: 2017-02-24

## 2017-02-23 RX ORDER — METOPROLOL TARTRATE 50 MG
25 TABLET ORAL THREE TIMES A DAY
Qty: 0 | Refills: 0 | Status: DISCONTINUED | OUTPATIENT
Start: 2017-02-23 | End: 2017-02-24

## 2017-02-23 RX ORDER — ACETAMINOPHEN 500 MG
650 TABLET ORAL EVERY 6 HOURS
Qty: 0 | Refills: 0 | Status: DISCONTINUED | OUTPATIENT
Start: 2017-02-23 | End: 2017-02-24

## 2017-02-23 RX ORDER — ATORVASTATIN CALCIUM 80 MG/1
40 TABLET, FILM COATED ORAL AT BEDTIME
Qty: 0 | Refills: 0 | Status: DISCONTINUED | OUTPATIENT
Start: 2017-02-23 | End: 2017-02-24

## 2017-02-23 RX ORDER — PANTOPRAZOLE SODIUM 20 MG/1
40 TABLET, DELAYED RELEASE ORAL
Qty: 0 | Refills: 0 | Status: DISCONTINUED | OUTPATIENT
Start: 2017-02-23 | End: 2017-02-24

## 2017-02-23 RX ORDER — TICAGRELOR 90 MG/1
90 TABLET ORAL
Qty: 0 | Refills: 0 | Status: DISCONTINUED | OUTPATIENT
Start: 2017-02-23 | End: 2017-02-24

## 2017-02-23 RX ORDER — ASPIRIN/CALCIUM CARB/MAGNESIUM 324 MG
81 TABLET ORAL DAILY
Qty: 0 | Refills: 0 | Status: DISCONTINUED | OUTPATIENT
Start: 2017-02-23 | End: 2017-02-24

## 2017-02-23 RX ADMIN — ATORVASTATIN CALCIUM 40 MILLIGRAM(S): 80 TABLET, FILM COATED ORAL at 21:28

## 2017-02-23 RX ADMIN — TICAGRELOR 90 MILLIGRAM(S): 90 TABLET ORAL at 18:20

## 2017-02-23 RX ADMIN — Medication 25 MILLIGRAM(S): at 21:28

## 2017-02-23 NOTE — PATIENT PROFILE ADULT. - ABILITY TO HEAR (WITH HEARING AID OR HEARING APPLIANCE IF NORMALLY USED):
Mildly to Moderately Impaired: difficulty hearing in some environments or speaker may need to increase volume or speak distinctly/wears b/l hearing aids

## 2017-02-23 NOTE — PATIENT PROFILE ADULT. - VISION (WITH CORRECTIVE LENSES IF THE PATIENT USUALLY WEARS THEM):
wears reading glasses/Partially impaired: cannot see medication labels or newsprint, but can see obstacles in path, and the surrounding layout; can count fingers at arm's length

## 2017-02-23 NOTE — PROGRESS NOTE ADULT - SUBJECTIVE AND OBJECTIVE BOX
Subjective:  82y  Female s/p C with PCI DEONDRE to 2nd diag, DEONDRE to pLAD, POBA to 1st diag with dissection, however with normal flow. Official report pending. LFA access with angioseal. Returned to holding room stable.       General: No fatigue, no fevers/chills  Respiratory: No dyspnea, no cough, no wheeze  CV: No chest pain, no palpitations  Abd: No nausea  Neuro: No headache, no dizziness      Objective:  Vital Signs Last 24 Hrs  T(C): 36.7, Max: 36.7 (02-23 @ 12:45)  T(F): 98, Max: 98 (02-23 @ 12:45)  HR: 71 (63 - 71)  BP: 142/63 (142/63 - 161/66)  BP(mean): --  RR: 18 (18 - 18)  SpO2: 96% (96% - 98%)    EKG: SB 59 inf TWI     NEURO: A & O x 3, no focal neurologic deficits  PULM:  CTA B/L No W/R/R  CARD: RRR, +S1, +S2, No M/R/G  ABD: ND, +BS, NT, no masses  EXT: L groin angioseal without hematoma or bleed  PULSES: + PP          A/P: CAD s/p PCI as described above  -  Continue DAPT with aspirin and Brilinta   -  Continue other home medications  -  Bedrest x 2 hours; notify provider with site complications  -  Admit to Telemetry bed   -  Follow up as an outpatient with Jackie   -  Labs, EKG in am  -  Probable discharge in am

## 2017-02-24 VITALS
OXYGEN SATURATION: 99 % | TEMPERATURE: 98 F | HEART RATE: 65 BPM | SYSTOLIC BLOOD PRESSURE: 128 MMHG | RESPIRATION RATE: 17 BRPM | DIASTOLIC BLOOD PRESSURE: 60 MMHG

## 2017-02-24 LAB
ANION GAP SERPL CALC-SCNC: 12 MMOL/L — SIGNIFICANT CHANGE UP (ref 5–17)
BUN SERPL-MCNC: 14 MG/DL — SIGNIFICANT CHANGE UP (ref 8–20)
CALCIUM SERPL-MCNC: 8.5 MG/DL — LOW (ref 8.6–10.2)
CHLORIDE SERPL-SCNC: 107 MMOL/L — SIGNIFICANT CHANGE UP (ref 98–107)
CO2 SERPL-SCNC: 22 MMOL/L — SIGNIFICANT CHANGE UP (ref 22–29)
CREAT SERPL-MCNC: 0.83 MG/DL — SIGNIFICANT CHANGE UP (ref 0.5–1.3)
GLUCOSE SERPL-MCNC: 80 MG/DL — SIGNIFICANT CHANGE UP (ref 70–115)
HCT VFR BLD CALC: 36.2 % — LOW (ref 37–47)
HGB BLD-MCNC: 12.1 G/DL — SIGNIFICANT CHANGE UP (ref 12–16)
MAGNESIUM SERPL-MCNC: 2.2 MG/DL — SIGNIFICANT CHANGE UP (ref 1.8–2.5)
MCHC RBC-ENTMCNC: 31.7 PG — HIGH (ref 27–31)
MCHC RBC-ENTMCNC: 33.4 G/DL — SIGNIFICANT CHANGE UP (ref 32–36)
MCV RBC AUTO: 94.8 FL — SIGNIFICANT CHANGE UP (ref 81–99)
PLATELET # BLD AUTO: 215 K/UL — SIGNIFICANT CHANGE UP (ref 150–400)
POTASSIUM SERPL-MCNC: 4.2 MMOL/L — SIGNIFICANT CHANGE UP (ref 3.5–5.3)
POTASSIUM SERPL-SCNC: 4.2 MMOL/L — SIGNIFICANT CHANGE UP (ref 3.5–5.3)
RBC # BLD: 3.82 M/UL — LOW (ref 4.4–5.2)
RBC # FLD: 12.7 % — SIGNIFICANT CHANGE UP (ref 11–15.6)
SODIUM SERPL-SCNC: 141 MMOL/L — SIGNIFICANT CHANGE UP (ref 135–145)
WBC # BLD: 5.3 K/UL — SIGNIFICANT CHANGE UP (ref 4.8–10.8)
WBC # FLD AUTO: 5.3 K/UL — SIGNIFICANT CHANGE UP (ref 4.8–10.8)

## 2017-02-24 PROCEDURE — 85027 COMPLETE CBC AUTOMATED: CPT

## 2017-02-24 PROCEDURE — 83735 ASSAY OF MAGNESIUM: CPT

## 2017-02-24 PROCEDURE — C9602: CPT

## 2017-02-24 PROCEDURE — 92978 ENDOLUMINL IVUS OCT C 1ST: CPT

## 2017-02-24 PROCEDURE — 93010 ELECTROCARDIOGRAM REPORT: CPT

## 2017-02-24 PROCEDURE — C1874: CPT

## 2017-02-24 PROCEDURE — 92979 ENDOLUMINL IVUS OCT C EA: CPT

## 2017-02-24 PROCEDURE — C1760: CPT

## 2017-02-24 PROCEDURE — C9601: CPT | Mod: LD

## 2017-02-24 PROCEDURE — C1725: CPT

## 2017-02-24 PROCEDURE — C1894: CPT

## 2017-02-24 PROCEDURE — C1887: CPT

## 2017-02-24 PROCEDURE — C1769: CPT

## 2017-02-24 PROCEDURE — 92921: CPT

## 2017-02-24 PROCEDURE — 80048 BASIC METABOLIC PNL TOTAL CA: CPT

## 2017-02-24 PROCEDURE — 93005 ELECTROCARDIOGRAM TRACING: CPT

## 2017-02-24 PROCEDURE — C1724: CPT

## 2017-02-24 RX ORDER — TICAGRELOR 90 MG/1
1 TABLET ORAL
Qty: 180 | Refills: 0 | OUTPATIENT
Start: 2017-02-24

## 2017-02-24 RX ORDER — ATORVASTATIN CALCIUM 80 MG/1
1 TABLET, FILM COATED ORAL
Qty: 90 | Refills: 0 | OUTPATIENT
Start: 2017-02-24

## 2017-02-24 RX ORDER — OMEPRAZOLE 10 MG/1
1 CAPSULE, DELAYED RELEASE ORAL
Qty: 0 | Refills: 0 | COMMUNITY

## 2017-02-24 RX ORDER — ASPIRIN/CALCIUM CARB/MAGNESIUM 324 MG
1 TABLET ORAL
Qty: 90 | Refills: 0 | OUTPATIENT
Start: 2017-02-24

## 2017-02-24 RX ORDER — PANTOPRAZOLE SODIUM 20 MG/1
1 TABLET, DELAYED RELEASE ORAL
Qty: 30 | Refills: 0 | OUTPATIENT
Start: 2017-02-24

## 2017-02-24 RX ORDER — METOPROLOL TARTRATE 50 MG
1 TABLET ORAL
Qty: 0 | Refills: 0 | COMMUNITY
Start: 2017-02-24

## 2017-02-24 RX ADMIN — Medication 25 MILLIGRAM(S): at 05:21

## 2017-02-24 RX ADMIN — TICAGRELOR 90 MILLIGRAM(S): 90 TABLET ORAL at 05:21

## 2017-02-24 RX ADMIN — Medication 81 MILLIGRAM(S): at 11:40

## 2017-02-24 RX ADMIN — PANTOPRAZOLE SODIUM 40 MILLIGRAM(S): 20 TABLET, DELAYED RELEASE ORAL at 05:22

## 2017-02-24 NOTE — DISCHARGE NOTE ADULT - CARE PROVIDER_API CALL
Triston Mejia), Cardiovascular Disease; Interventional Cardiology  [address] [place] [address]  Phone: [phone]  Fax: [phone]

## 2017-02-24 NOTE — DISCHARGE NOTE ADULT - HOSPITAL COURSE
A/P: CAD s/p PCI as described above  -  Continue DAPT with aspirin and Brilinta   -  Continue other home medication  -  Follow up as an outpatient with Jackie   - explained aggressive life modifications in detail

## 2017-02-24 NOTE — DISCHARGE NOTE ADULT - CARE PLAN
Principal Discharge DX:	CAD (coronary artery disease)  Goal:	maintain optimum heart function  Instructions for follow-up, activity and diet:	as tolerated

## 2017-02-24 NOTE — DISCHARGE NOTE ADULT - MEDICATION SUMMARY - MEDICATIONS TO STOP TAKING
I will STOP taking the medications listed below when I get home from the hospital:    omeprazole 20 mg oral delayed release capsule  -- 1 cap(s) by mouth once a day

## 2017-02-24 NOTE — DISCHARGE NOTE ADULT - ADDITIONAL INSTRUCTIONS
No heavy lifting, driving, sex, tub baths, swimming, or any activity that submerges the lower half of the body in water for 48 hours.  Limited walking and stairs for 48 hours.    Change the bandaid after 24 hours and every 24 hours after that.  Keep the puncture site dry and covered with a bandaid until a scab forms.    Observe the site frequently.  If bleeding or a large lump (the size of a golf ball or bigger) occurs lie flat, apply continuous direct pressure just above the puncture site for at least 10 minutes, and notify your physician immediately.  If the bleeding cannot be controlled, call 911 immediately for assistance.  Notify your physician of pain, swelling or any drainage.    Notify your physician immediately if coldness, numbness, discoloration or pain in your foot occurs.

## 2017-02-24 NOTE — PROGRESS NOTE ADULT - SUBJECTIVE AND OBJECTIVE BOX
Chief Complaint:  CAD    History of Present Illness:   		  This is an 82 y/o female with a past medical history of HPL and HTN who presented to [facility] with an acute inferior wall myocardial infarction on 17.  Her cardiac cath showed 99% stenosis of the distal RCA which was stented.  She also has a D2 lesion and STEWART.  Underwent stage PCI yesterday:      · Subjective and Objective: 	  Subjective:  82y  Female s/p [facility] with PCI DEONDRE to 2nd diag, DEONDRE to pLAD, POBA to 1st diag with dissection, however with normal flow.    Other nonspecific abnormal cardiovascular system function study  Family history of stomach cancer  CAD (coronary artery disease) (Sibling)  Handoff  CAD (coronary artery disease)  MI (myocardial infarction)  GERD (gastroesophageal reflux disease)  Osteoporosis  Stented coronary artery  S/P knee replacement      REVIEW OF SYSTEMS    General: Denies fever, chills, pain, no discomfort  	  Respiratory and Thorax:  Denies cough, sob, or any discomfort  	  Cardiovascular:  Denies chest pain, palpitations or any discomfort	    Gastrointestinal:  Denies n/v/d, constipation, or any discomfort    Genitourinary:  Denies frequency, burning, or pain    Musculoskeletal:  Denies joint pain, swelling, or any discomfort     Neurological:  Denies headache, dizziness blurred vision, numbing or tingling    Psychiatric:  Denies sadness or depression    Hematology  Yuriy bleeding o swelling    Allergic/Immunologic:	  MEDICATIONS:  metoprolol 25milliGRAM(s) Oral three times a day  acetaminophen   Tablet. 650milliGRAM(s) Oral every 6 hours PRN  pantoprazole    Tablet 40milliGRAM(s) Oral before breakfast  atorvastatin 40milliGRAM(s) Oral at bedtime  sodium chloride 0.9%. 500milliLiter(s) IV Continuous <Continuous>  aspirin  chewable 81milliGRAM(s) Oral daily  ticagrelor 90milliGRAM(s) Oral two times a day        PHYSICAL EXAM:    T(C): 36.7, Max: 36.8 (02-23 @ 21:10)  HR: 64 (60 - 71)  BP: 127/52 (99/67 - 161/66)  RR: 16 (16 - 18)  SpO2: 98% (96% - 99%)  Wt(kg): --    I&O's Summary      Daily     Daily Weight in k.7 (2017 04:59)    Appearance: Normal	  HEENT:   Normal oral mucosa, PERRL, EOMI	  Lymphatic: No lymphadenopathy  Cardiovascular: Normal S1 S2, No JVD, No murmurs, No edema  Respiratory: Lungs clear to auscultation	  Psychiatry: A & O x 3, Mood & affect appropriate  Gastrointestinal:  Soft, Non-tender, + BS	  Skin: No rashes, No ecchymoses, No cyanosis  Neurologic: Non-focal  Extremities: Normal range of motion, No clubbing, cyanosis or edema  Vascular: Peripheral pulses palpable 2+ bilaterally  Procedure Site:  right groin, no bleeding, no pain, no hematoma, no bruising, +PP, no edema.        TELEMETRY: 	  SR with occasional PVC's and PAC's  ECG:  	Sr with PVCs.               12.1   5.3   )-----------( 215      ( 2017 05:19 )             36.2     2017 05:19    141    |  107    |  14.0   ----------------------------<  80     4.2     |  22.0   |  0.83     Ca    8.5        2017 05:19  Mg     2.2       2017 05:19          A/P: CAD s/p PCI as described above  -  Continue DAPT with aspirin and Brilinta   -  Continue other home medication  -  Follow up as an outpatient with Jackie   - explained aggressive life modifications in detail

## 2017-02-24 NOTE — DISCHARGE NOTE ADULT - NS AS ACTIVITY OBS
No Heavy lifting/straining/Walking-Indoors allowed/Showering allowed/Do not make important decisions/Do not drive or operate machinery

## 2017-02-24 NOTE — DISCHARGE NOTE ADULT - PATIENT PORTAL LINK FT
“You can access the [employer] Patient Portal, offered by [facility], by registering with the following website: [email or web address]”

## 2017-02-24 NOTE — DISCHARGE NOTE ADULT - ABILITY TO HEAR (WITH HEARING AID OR HEARING APPLIANCE IF NORMALLY USED):
wears b/l hearing aids/Mildly to Moderately Impaired: difficulty hearing in some environments or speaker may need to increase volume or speak distinctly

## 2017-02-24 NOTE — DISCHARGE NOTE ADULT - MEDICATION SUMMARY - MEDICATIONS TO TAKE
I will START or STAY ON the medications listed below when I get home from the hospital:    aspirin 81 mg oral tablet, chewable  -- 1 tab(s) by mouth once a day  -- Indication: For caf    atorvastatin 40 mg oral tablet  -- 1 tab(s) by mouth once a day (at bedtime)  -- Indication: For cad    ticagrelor 90 mg oral tablet  -- 1 tab(s) by mouth 2 times a day  -- Indication: For cad    metoprolol tartrate 25 mg oral tablet  -- 1 tab(s) by mouth 3 times a day  -- Indication: For htn    pantoprazole 40 mg oral delayed release tablet  -- 1 tab(s) by mouth once a day (before a meal)  -- Indication: For gerd

## 2017-05-01 NOTE — DISCHARGE NOTE ADULT - INSTRUCTIONS
This office note has been dictated.     Choose lean meats and poultry without skin and prepare them without added saturated and trans fat.  Eat fish at least twice a week. Recent research shows that eating oily fish containing omega-3 fatty acids (for example, salmon, trout and herring) may help lower your risk of death from coronary artery disease.  Select fat-free, 1 percent fat and low-fat dairy products.  Cut back on foods containing partially hydrogenated vegetable oils to reduce trans fat in your diet.   To lower cholesterol, reduce saturated fat to no more than 5 to 6 percent of total calories. For someone eating 2,000 calories a day, that’s about 13 grams of saturated fat.  Cut back on beverages and foods with added sugars.  Choose and prepare foods with little or no salt. To lower blood pressure, aim to eat no more than 2,400 milligrams of sodium per day. Reducing daily intake to 1,500 mg is desirable because it can lower blood pressure even further.  If you drink alcohol, drink in moderation. That means one drink per day if you’re a woman and two drinks  per day if you’re a man.  Follow the American Heart Association recommendations when you eat out, and keep an eye on your portion sizes.

## 2018-04-02 ENCOUNTER — APPOINTMENT (OUTPATIENT)
Dept: VASCULAR SURGERY | Facility: CLINIC | Age: 83
End: 2018-04-02
Payer: MEDICARE

## 2018-04-02 VITALS
OXYGEN SATURATION: 98 % | HEART RATE: 68 BPM | DIASTOLIC BLOOD PRESSURE: 74 MMHG | WEIGHT: 164 LBS | SYSTOLIC BLOOD PRESSURE: 155 MMHG | HEIGHT: 66 IN | BODY MASS INDEX: 26.36 KG/M2

## 2018-04-02 DIAGNOSIS — Z82.49 FAMILY HISTORY OF ISCHEMIC HEART DISEASE AND OTHER DISEASES OF THE CIRCULATORY SYSTEM: ICD-10-CM

## 2018-04-02 DIAGNOSIS — Z87.39 PERSONAL HISTORY OF OTHER DISEASES OF THE MUSCULOSKELETAL SYSTEM AND CONNECTIVE TISSUE: ICD-10-CM

## 2018-04-02 DIAGNOSIS — Z83.511 FAMILY HISTORY OF GLAUCOMA: ICD-10-CM

## 2018-04-02 DIAGNOSIS — Z80.0 FAMILY HISTORY OF MALIGNANT NEOPLASM OF DIGESTIVE ORGANS: ICD-10-CM

## 2018-04-02 DIAGNOSIS — Z82.3 FAMILY HISTORY OF STROKE: ICD-10-CM

## 2018-04-02 DIAGNOSIS — I25.2 OLD MYOCARDIAL INFARCTION: ICD-10-CM

## 2018-04-02 PROCEDURE — 99204 OFFICE O/P NEW MOD 45 MIN: CPT

## 2018-04-03 RX ORDER — ATORVASTATIN CALCIUM 40 MG/1
40 TABLET, FILM COATED ORAL
Refills: 0 | Status: ACTIVE | COMMUNITY

## 2018-04-03 RX ORDER — ASPIRIN 81 MG
81 TABLET, DELAYED RELEASE (ENTERIC COATED) ORAL
Refills: 0 | Status: ACTIVE | COMMUNITY

## 2018-04-03 RX ORDER — PANTOPRAZOLE 40 MG/1
40 TABLET, DELAYED RELEASE ORAL
Refills: 0 | Status: ACTIVE | COMMUNITY

## 2018-04-10 ENCOUNTER — OUTPATIENT (OUTPATIENT)
Dept: OUTPATIENT SERVICES | Facility: HOSPITAL | Age: 83
LOS: 1 days | End: 2018-04-10
Payer: MEDICARE

## 2018-04-10 VITALS
HEART RATE: 69 BPM | SYSTOLIC BLOOD PRESSURE: 158 MMHG | WEIGHT: 165.35 LBS | TEMPERATURE: 97 F | HEIGHT: 66 IN | RESPIRATION RATE: 16 BRPM | DIASTOLIC BLOOD PRESSURE: 68 MMHG

## 2018-04-10 DIAGNOSIS — Z96.652 PRESENCE OF LEFT ARTIFICIAL KNEE JOINT: Chronic | ICD-10-CM

## 2018-04-10 DIAGNOSIS — I25.10 ATHEROSCLEROTIC HEART DISEASE OF NATIVE CORONARY ARTERY WITHOUT ANGINA PECTORIS: ICD-10-CM

## 2018-04-10 DIAGNOSIS — Z96.659 PRESENCE OF UNSPECIFIED ARTIFICIAL KNEE JOINT: Chronic | ICD-10-CM

## 2018-04-10 DIAGNOSIS — Z95.5 PRESENCE OF CORONARY ANGIOPLASTY IMPLANT AND GRAFT: Chronic | ICD-10-CM

## 2018-04-10 DIAGNOSIS — I65.23 OCCLUSION AND STENOSIS OF BILATERAL CAROTID ARTERIES: ICD-10-CM

## 2018-04-10 DIAGNOSIS — Z01.818 ENCOUNTER FOR OTHER PREPROCEDURAL EXAMINATION: ICD-10-CM

## 2018-04-10 DIAGNOSIS — I21.9 ACUTE MYOCARDIAL INFARCTION, UNSPECIFIED: ICD-10-CM

## 2018-04-10 DIAGNOSIS — Z98.49 CATARACT EXTRACTION STATUS, UNSPECIFIED EYE: Chronic | ICD-10-CM

## 2018-04-10 DIAGNOSIS — Z29.9 ENCOUNTER FOR PROPHYLACTIC MEASURES, UNSPECIFIED: ICD-10-CM

## 2018-04-10 LAB
ANION GAP SERPL CALC-SCNC: 14 MMOL/L — SIGNIFICANT CHANGE UP (ref 5–17)
APTT BLD: 30.7 SEC — SIGNIFICANT CHANGE UP (ref 27.5–37.4)
BASOPHILS # BLD AUTO: 0 K/UL — SIGNIFICANT CHANGE UP (ref 0–0.2)
BASOPHILS NFR BLD AUTO: 0.5 % — SIGNIFICANT CHANGE UP (ref 0–2)
BLD GP AB SCN SERPL QL: SIGNIFICANT CHANGE UP
BUN SERPL-MCNC: 27 MG/DL — HIGH (ref 8–20)
CALCIUM SERPL-MCNC: 9.4 MG/DL — SIGNIFICANT CHANGE UP (ref 8.6–10.2)
CHLORIDE SERPL-SCNC: 103 MMOL/L — SIGNIFICANT CHANGE UP (ref 98–107)
CO2 SERPL-SCNC: 25 MMOL/L — SIGNIFICANT CHANGE UP (ref 22–29)
CREAT SERPL-MCNC: 1.02 MG/DL — SIGNIFICANT CHANGE UP (ref 0.5–1.3)
EOSINOPHIL # BLD AUTO: 0.2 K/UL — SIGNIFICANT CHANGE UP (ref 0–0.5)
EOSINOPHIL NFR BLD AUTO: 2.8 % — SIGNIFICANT CHANGE UP (ref 0–6)
GLUCOSE SERPL-MCNC: 111 MG/DL — SIGNIFICANT CHANGE UP (ref 70–115)
HCT VFR BLD CALC: 39.8 % — SIGNIFICANT CHANGE UP (ref 37–47)
HGB BLD-MCNC: 13 G/DL — SIGNIFICANT CHANGE UP (ref 12–16)
INR BLD: 1.04 RATIO — SIGNIFICANT CHANGE UP (ref 0.88–1.16)
LYMPHOCYTES # BLD AUTO: 1.3 K/UL — SIGNIFICANT CHANGE UP (ref 1–4.8)
LYMPHOCYTES # BLD AUTO: 21.4 % — SIGNIFICANT CHANGE UP (ref 20–55)
MCHC RBC-ENTMCNC: 31.5 PG — HIGH (ref 27–31)
MCHC RBC-ENTMCNC: 32.7 G/DL — SIGNIFICANT CHANGE UP (ref 32–36)
MCV RBC AUTO: 96.4 FL — SIGNIFICANT CHANGE UP (ref 81–99)
MONOCYTES # BLD AUTO: 0.6 K/UL — SIGNIFICANT CHANGE UP (ref 0–0.8)
MONOCYTES NFR BLD AUTO: 9.1 % — SIGNIFICANT CHANGE UP (ref 3–10)
NEUTROPHILS # BLD AUTO: 4 K/UL — SIGNIFICANT CHANGE UP (ref 1.8–8)
NEUTROPHILS NFR BLD AUTO: 66 % — SIGNIFICANT CHANGE UP (ref 37–73)
PLATELET # BLD AUTO: 272 K/UL — SIGNIFICANT CHANGE UP (ref 150–400)
POTASSIUM SERPL-MCNC: 4.2 MMOL/L — SIGNIFICANT CHANGE UP (ref 3.5–5.3)
POTASSIUM SERPL-SCNC: 4.2 MMOL/L — SIGNIFICANT CHANGE UP (ref 3.5–5.3)
PROTHROM AB SERPL-ACNC: 11.5 SEC — SIGNIFICANT CHANGE UP (ref 9.8–12.7)
RBC # BLD: 4.13 M/UL — LOW (ref 4.4–5.2)
RBC # FLD: 13.6 % — SIGNIFICANT CHANGE UP (ref 11–15.6)
SODIUM SERPL-SCNC: 142 MMOL/L — SIGNIFICANT CHANGE UP (ref 135–145)
TYPE + AB SCN PNL BLD: SIGNIFICANT CHANGE UP
WBC # BLD: 6.1 K/UL — SIGNIFICANT CHANGE UP (ref 4.8–10.8)
WBC # FLD AUTO: 6.1 K/UL — SIGNIFICANT CHANGE UP (ref 4.8–10.8)

## 2018-04-10 RX ORDER — VANCOMYCIN HCL 1 G
1000 VIAL (EA) INTRAVENOUS ONCE
Qty: 0 | Refills: 0 | Status: DISCONTINUED | OUTPATIENT
Start: 2018-04-16 | End: 2018-04-17

## 2018-04-10 NOTE — PATIENT PROFILE ADULT. - ABILITY TO HEAR (WITH HEARING AID OR HEARING APPLIANCE IF NORMALLY USED):
Mildly to Moderately Impaired: difficulty hearing in some environments or speaker may need to increase volume or speak distinctly/hearing aid

## 2018-04-10 NOTE — PATIENT PROFILE ADULT. - GENERAL HEALTH, PREVIOUS, PROFILE
good

## 2018-04-10 NOTE — H&P PST ADULT - NSANTHOSAYNRD_GEN_A_CORE
No. DIOGO screening performed.  STOP BANG Legend: 0-2 = LOW Risk; 3-4 = INTERMEDIATE Risk; 5-8 = HIGH Risk

## 2018-04-10 NOTE — H&P PST ADULT - PSH
H/O total knee replacement, left    S/P cataract extraction and insertion of intraocular lens    S/P knee replacement  left  Stented coronary artery  jan 260 2017   dr bland  Feb 2017 2 stents

## 2018-04-10 NOTE — H&P PST ADULT - PMH
CAD (coronary artery disease)    Carotid stenosis    Compression fracture of thoracic vertebra    GERD (gastroesophageal reflux disease)    MI (myocardial infarction)  i stent placed  jan   Osteoporosis

## 2018-04-10 NOTE — H&P PST ADULT - ASSESSMENT
83 year old female present with carotid stenosis. She is schedule for right trans carotid artery revascularization possible carotid endarterectomy.     CAPRINI SCORE [CLOT]    AGE RELATED RISK FACTORS                                                       MOBILITY RELATED FACTORS  [ ] Age 41-60 years                                            (1 Point)                  [ ] Bed rest                                                        (1 Point)  [ ] Age: 61-74 years                                           (2 Points)                 [ ] Plaster cast                                                   (2 Points)  [ ] Age= 75 years                                              (3 Points)                 [ ] Bed bound for more than 72 hours                 (2 Points)    DISEASE RELATED RISK FACTORS                                               GENDER SPECIFIC FACTORS  [ ] Edema in the lower extremities                       (1 Point)                  [ ] Pregnancy                                                     (1 Point)  [ ] Varicose veins                                               (1 Point)                  [ ] Post-partum < 6 weeks                                   (1 Point)             [ ] BMI > 25 Kg/m2                                            (1 Point)                  [ ] Hormonal therapy  or oral contraception          (1 Point)                 [ ] Sepsis (in the previous month)                        (1 Point)                  [ ] History of pregnancy complications                 (1 point)  [ ] Pneumonia or serious lung disease                                               [ ] Unexplained or recurrent                     (1 Point)           (in the previous month)                               (1 Point)  [ ] Abnormal pulmonary function test                     (1 Point)                 SURGERY RELATED RISK FACTORS  [ ] Acute myocardial infarction                              (1 Point)                 [ ]  Section                                             (1 Point)  [ ] Congestive heart failure (in the previous month)  (1 Point)               [ ] Minor surgery                                                  (1 Point)   [ ] Inflammatory bowel disease                             (1 Point)                 [ ] Arthroscopic surgery                                        (2 Points)  [ ] Central venous access                                      (2 Points)                [ ] General surgery lasting more than 45 minutes   (2 Points)       [ ] Stroke (in the previous month)                          (5 Points)               [ ] Elective arthroplasty                                         (5 Points)                                                                                                                                               HEMATOLOGY RELATED FACTORS                                                 TRAUMA RELATED RISK FACTORS  [ ] Prior episodes of VTE                                     (3 Points)                 [ ] Fracture of the hip, pelvis, or leg                       (5 Points)  [ ] Positive family history for VTE                         (3 Points)                 [ ] Acute spinal cord injury (in the previous month)  (5 Points)  [ ] Prothrombin 74563 A                                     (3 Points)                 [ ] Paralysis  (less than 1 month)                             (5 Points)  [ ] Factor V Leiden                                             (3 Points)                  [ ] Multiple Trauma within 1 month                        (5 Points)  [ ] Lupus anticoagulants                                     (3 Points)                                                           [ ] Anticardiolipin antibodies                               (3 Points)                                                       [ ] High homocysteine in the blood                      (3 Points)                                             [ ] Other congenital or acquired thrombophilia      (3 Points)                                                [ ] Heparin induced thrombocytopenia                  (3 Points)                                          Total Score [          ]

## 2018-04-10 NOTE — H&P PST ADULT - HISTORY OF PRESENT ILLNESS
83 year old female present with carotid stenosis. She is schedule for right trans carotid artery revascularization possible carotid endarterectomy.

## 2018-04-10 NOTE — H&P PST ADULT - FAMILY HISTORY
Family history of stomach cancer     Sibling  Still living? Unknown  CAD (coronary artery disease), Age at diagnosis: 61-70

## 2018-04-15 ENCOUNTER — TRANSCRIPTION ENCOUNTER (OUTPATIENT)
Age: 83
End: 2018-04-15

## 2018-04-16 ENCOUNTER — INPATIENT (INPATIENT)
Facility: HOSPITAL | Age: 83
LOS: 1 days | Discharge: HOME | DRG: 36 | End: 2018-04-18
Attending: SURGERY | Admitting: SURGERY
Payer: MEDICARE

## 2018-04-16 VITALS
RESPIRATION RATE: 16 BRPM | HEIGHT: 66 IN | OXYGEN SATURATION: 100 % | TEMPERATURE: 98 F | SYSTOLIC BLOOD PRESSURE: 138 MMHG | DIASTOLIC BLOOD PRESSURE: 63 MMHG | HEART RATE: 66 BPM | WEIGHT: 166.01 LBS

## 2018-04-16 DIAGNOSIS — I65.23 OCCLUSION AND STENOSIS OF BILATERAL CAROTID ARTERIES: ICD-10-CM

## 2018-04-16 DIAGNOSIS — Z96.652 PRESENCE OF LEFT ARTIFICIAL KNEE JOINT: Chronic | ICD-10-CM

## 2018-04-16 DIAGNOSIS — Z96.659 PRESENCE OF UNSPECIFIED ARTIFICIAL KNEE JOINT: Chronic | ICD-10-CM

## 2018-04-16 DIAGNOSIS — Z98.49 CATARACT EXTRACTION STATUS, UNSPECIFIED EYE: Chronic | ICD-10-CM

## 2018-04-16 DIAGNOSIS — Z95.5 PRESENCE OF CORONARY ANGIOPLASTY IMPLANT AND GRAFT: Chronic | ICD-10-CM

## 2018-04-16 LAB
ANION GAP SERPL CALC-SCNC: 14 MMOL/L — SIGNIFICANT CHANGE UP (ref 5–17)
APTT BLD: 42.7 SEC — HIGH (ref 27.5–37.4)
BASOPHILS # BLD AUTO: 0 K/UL — SIGNIFICANT CHANGE UP (ref 0–0.2)
BASOPHILS NFR BLD AUTO: 0.2 % — SIGNIFICANT CHANGE UP (ref 0–2)
BUN SERPL-MCNC: 23 MG/DL — HIGH (ref 8–20)
CALCIUM SERPL-MCNC: 8.1 MG/DL — LOW (ref 8.6–10.2)
CHLORIDE SERPL-SCNC: 104 MMOL/L — SIGNIFICANT CHANGE UP (ref 98–107)
CO2 SERPL-SCNC: 21 MMOL/L — LOW (ref 22–29)
CREAT SERPL-MCNC: 0.83 MG/DL — SIGNIFICANT CHANGE UP (ref 0.5–1.3)
EOSINOPHIL # BLD AUTO: 0 K/UL — SIGNIFICANT CHANGE UP (ref 0–0.5)
EOSINOPHIL NFR BLD AUTO: 0.6 % — SIGNIFICANT CHANGE UP (ref 0–6)
GLUCOSE SERPL-MCNC: 123 MG/DL — HIGH (ref 70–115)
HCT VFR BLD CALC: 34.6 % — LOW (ref 37–47)
HGB BLD-MCNC: 11.5 G/DL — LOW (ref 12–16)
INR BLD: 1.15 RATIO — SIGNIFICANT CHANGE UP (ref 0.88–1.16)
LYMPHOCYTES # BLD AUTO: 0.8 K/UL — LOW (ref 1–4.8)
LYMPHOCYTES # BLD AUTO: 9.3 % — LOW (ref 20–55)
MAGNESIUM SERPL-MCNC: 1.8 MG/DL — SIGNIFICANT CHANGE UP (ref 1.6–2.6)
MCHC RBC-ENTMCNC: 31.7 PG — HIGH (ref 27–31)
MCHC RBC-ENTMCNC: 33.2 G/DL — SIGNIFICANT CHANGE UP (ref 32–36)
MCV RBC AUTO: 95.3 FL — SIGNIFICANT CHANGE UP (ref 81–99)
MONOCYTES # BLD AUTO: 0.2 K/UL — SIGNIFICANT CHANGE UP (ref 0–0.8)
MONOCYTES NFR BLD AUTO: 2.1 % — LOW (ref 3–10)
NEUTROPHILS # BLD AUTO: 7.1 K/UL — SIGNIFICANT CHANGE UP (ref 1.8–8)
NEUTROPHILS NFR BLD AUTO: 87.7 % — HIGH (ref 37–73)
PLATELET # BLD AUTO: 218 K/UL — SIGNIFICANT CHANGE UP (ref 150–400)
POTASSIUM SERPL-MCNC: 4.2 MMOL/L — SIGNIFICANT CHANGE UP (ref 3.5–5.3)
POTASSIUM SERPL-SCNC: 4.2 MMOL/L — SIGNIFICANT CHANGE UP (ref 3.5–5.3)
PROTHROM AB SERPL-ACNC: 12.7 SEC — SIGNIFICANT CHANGE UP (ref 9.8–12.7)
RBC # BLD: 3.63 M/UL — LOW (ref 4.4–5.2)
RBC # FLD: 13.5 % — SIGNIFICANT CHANGE UP (ref 11–15.6)
SODIUM SERPL-SCNC: 139 MMOL/L — SIGNIFICANT CHANGE UP (ref 135–145)
TROPONIN T SERPL-MCNC: <0.01 NG/ML — SIGNIFICANT CHANGE UP (ref 0–0.06)
WBC # BLD: 8.1 K/UL — SIGNIFICANT CHANGE UP (ref 4.8–10.8)
WBC # FLD AUTO: 8.1 K/UL — SIGNIFICANT CHANGE UP (ref 4.8–10.8)

## 2018-04-16 PROCEDURE — G0463: CPT

## 2018-04-16 PROCEDURE — 86850 RBC ANTIBODY SCREEN: CPT

## 2018-04-16 PROCEDURE — 86900 BLOOD TYPING SEROLOGIC ABO: CPT

## 2018-04-16 PROCEDURE — 85730 THROMBOPLASTIN TIME PARTIAL: CPT

## 2018-04-16 PROCEDURE — 85027 COMPLETE CBC AUTOMATED: CPT

## 2018-04-16 PROCEDURE — 37215 TRANSCATH STENT CCA W/EPS: CPT | Mod: RT

## 2018-04-16 PROCEDURE — 36415 COLL VENOUS BLD VENIPUNCTURE: CPT

## 2018-04-16 PROCEDURE — 80048 BASIC METABOLIC PNL TOTAL CA: CPT

## 2018-04-16 PROCEDURE — 86901 BLOOD TYPING SEROLOGIC RH(D): CPT

## 2018-04-16 PROCEDURE — 86923 COMPATIBILITY TEST ELECTRIC: CPT

## 2018-04-16 PROCEDURE — 85610 PROTHROMBIN TIME: CPT

## 2018-04-16 RX ORDER — SODIUM CHLORIDE 9 MG/ML
1000 INJECTION, SOLUTION INTRAVENOUS
Qty: 0 | Refills: 0 | Status: DISCONTINUED | OUTPATIENT
Start: 2018-04-16 | End: 2018-04-16

## 2018-04-16 RX ORDER — PHENYLEPHRINE HYDROCHLORIDE 10 MG/ML
0.71 INJECTION INTRAVENOUS
Qty: 160 | Refills: 0 | Status: DISCONTINUED | OUTPATIENT
Start: 2018-04-16 | End: 2018-04-18

## 2018-04-16 RX ORDER — SODIUM CHLORIDE 9 MG/ML
500 INJECTION, SOLUTION INTRAVENOUS ONCE
Qty: 0 | Refills: 0 | Status: COMPLETED | OUTPATIENT
Start: 2018-04-16 | End: 2018-04-16

## 2018-04-16 RX ORDER — TICAGRELOR 90 MG/1
90 TABLET ORAL
Qty: 0 | Refills: 0 | Status: DISCONTINUED | OUTPATIENT
Start: 2018-04-16 | End: 2018-04-18

## 2018-04-16 RX ORDER — ACETAMINOPHEN 500 MG
650 TABLET ORAL EVERY 6 HOURS
Qty: 0 | Refills: 0 | Status: DISCONTINUED | OUTPATIENT
Start: 2018-04-16 | End: 2018-04-18

## 2018-04-16 RX ORDER — ONDANSETRON 8 MG/1
4 TABLET, FILM COATED ORAL ONCE
Qty: 0 | Refills: 0 | Status: DISCONTINUED | OUTPATIENT
Start: 2018-04-16 | End: 2018-04-16

## 2018-04-16 RX ORDER — ASPIRIN/CALCIUM CARB/MAGNESIUM 324 MG
81 TABLET ORAL DAILY
Qty: 0 | Refills: 0 | Status: DISCONTINUED | OUTPATIENT
Start: 2018-04-16 | End: 2018-04-18

## 2018-04-16 RX ORDER — ONDANSETRON 8 MG/1
4 TABLET, FILM COATED ORAL EVERY 6 HOURS
Qty: 0 | Refills: 0 | Status: DISCONTINUED | OUTPATIENT
Start: 2018-04-16 | End: 2018-04-18

## 2018-04-16 RX ORDER — TRAMADOL HYDROCHLORIDE 50 MG/1
50 TABLET ORAL EVERY 6 HOURS
Qty: 0 | Refills: 0 | Status: DISCONTINUED | OUTPATIENT
Start: 2018-04-16 | End: 2018-04-18

## 2018-04-16 RX ORDER — PANTOPRAZOLE SODIUM 20 MG/1
40 TABLET, DELAYED RELEASE ORAL
Qty: 0 | Refills: 0 | Status: DISCONTINUED | OUTPATIENT
Start: 2018-04-16 | End: 2018-04-18

## 2018-04-16 RX ORDER — ATORVASTATIN CALCIUM 80 MG/1
40 TABLET, FILM COATED ORAL AT BEDTIME
Qty: 0 | Refills: 0 | Status: DISCONTINUED | OUTPATIENT
Start: 2018-04-16 | End: 2018-04-18

## 2018-04-16 RX ORDER — FENTANYL CITRATE 50 UG/ML
25 INJECTION INTRAVENOUS
Qty: 0 | Refills: 0 | Status: DISCONTINUED | OUTPATIENT
Start: 2018-04-16 | End: 2018-04-16

## 2018-04-16 RX ADMIN — ATORVASTATIN CALCIUM 40 MILLIGRAM(S): 80 TABLET, FILM COATED ORAL at 22:43

## 2018-04-16 RX ADMIN — SODIUM CHLORIDE 6000 MILLILITER(S): 9 INJECTION, SOLUTION INTRAVENOUS at 17:15

## 2018-04-16 RX ADMIN — SODIUM CHLORIDE 85 MILLILITER(S): 9 INJECTION, SOLUTION INTRAVENOUS at 15:30

## 2018-04-16 RX ADMIN — TICAGRELOR 90 MILLIGRAM(S): 90 TABLET ORAL at 18:39

## 2018-04-16 RX ADMIN — SODIUM CHLORIDE 6000 MILLILITER(S): 9 INJECTION, SOLUTION INTRAVENOUS at 18:00

## 2018-04-16 RX ADMIN — PHENYLEPHRINE HYDROCHLORIDE 10 MICROGRAM(S)/KG/MIN: 10 INJECTION INTRAVENOUS at 15:30

## 2018-04-16 NOTE — CONSULT NOTE ADULT - SUBJECTIVE AND OBJECTIVE BOX
HPI:  83 year old female present with carotid stenosis. She is schedule for right trans carotid artery revascularization possible carotid endarterectomy. Post operatively pt with SBP <120, started on Neosynephrine gtt.        ICU Vital Signs Last 24 Hrs  T(C): 36.8 (16 Apr 2018 14:40), Max: 36.8 (16 Apr 2018 14:40)  T(F): 98.2 (16 Apr 2018 14:40), Max: 98.2 (16 Apr 2018 14:40)  HR: 66 (16 Apr 2018 18:00) (66 - 79)  BP: 144/59 (16 Apr 2018 18:00) (98/52 - 162/63)  BP(mean): --  ABP: 180/71 (16 Apr 2018 18:00) (102/45 - 180/71)  ABP(mean): --  RR: 16 (16 Apr 2018 18:00) (12 - 22)  SpO2: 100% (16 Apr 2018 18:00) (97% - 100%)      I&O's Detail    16 Apr 2018 07:01  -  16 Apr 2018 18:03  --------------------------------------------------------  IN:    lactated ringers.: 170 mL    phenylephrine   Infusion: 29.2 mL  Total IN: 199.2 mL    OUT:    Bulb: 40 mL  Total OUT: 40 mL    Total NET: 159.2 mL                MEDICATIONS  (STANDING):  aspirin  chewable 81 milliGRAM(s) Oral daily  atorvastatin 40 milliGRAM(s) Oral at bedtime  lactated ringers Bolus 500 milliLiter(s) IV Bolus once  lactated ringers. 1000 milliLiter(s) (85 mL/Hr) IV Continuous <Continuous>  pantoprazole    Tablet 40 milliGRAM(s) Oral before breakfast  phenylephrine    Infusion 0.708 MICROgram(s)/kG/Min (10 mL/Hr) IV Continuous <Continuous>  ticagrelor 90 milliGRAM(s) Oral two times a day  vancomycin  IVPB 1000 milliGRAM(s) IV Intermittent once    MEDICATIONS  (PRN):  acetaminophen   Tablet. 650 milliGRAM(s) Oral every 6 hours PRN Moderate Pain (4 - 6)  fentaNYL    Injectable 25 MICROGram(s) IV Push every 5 minutes PRN Moderate Pain  ondansetron Injectable 4 milliGRAM(s) IV Push once PRN Nausea and/or Vomiting  ondansetron Injectable 4 milliGRAM(s) IV Push every 6 hours PRN Nausea and/or Vomiting  traMADol 50 milliGRAM(s) Oral every 6 hours PRN Severe Pain (7 - 10)      NUTRITION/IVF:  NPO/LR @ 83cc/hr    A-LINE:  Rt radial    MISC:  CURTIS Rt neck    PHYSICAL EXAM:    Gen:  NAD, awake    Eyes:  EOMI's BL    Neurological:  GCS 15, no deficit    Neck:  CURTIS drain noted Rt neck, hematoma noted.  Soft.     Pulmonary:  Unlabored    Cardiovascular:  NSR    Gastrointestinal:  Soft    Genitourinary:  Voids    Extremities:  No pedal edema    Skin:  Post op dressing C/D/I    Musculoskeletal:  AFROM x4          LABS:  CBC Full  -  ( 16 Apr 2018 16:14 )  WBC Count : 8.1 K/uL  Hemoglobin : 11.5 g/dL  Hematocrit : 34.6 %  Platelet Count - Automated : 218 K/uL  Mean Cell Volume : 95.3 fl  Mean Cell Hemoglobin : 31.7 pg  Mean Cell Hemoglobin Concentration : 33.2 g/dL  Auto Neutrophil # : 7.1 K/uL  Auto Lymphocyte # : 0.8 K/uL  Auto Monocyte # : 0.2 K/uL  Auto Eosinophil # : 0.0 K/uL  Auto Basophil # : 0.0 K/uL  Auto Neutrophil % : 87.7 %  Auto Lymphocyte % : 9.3 %  Auto Monocyte % : 2.1 %  Auto Eosinophil % : 0.6 %  Auto Basophil % : 0.2 %    04-16    139  |  104  |  23.0<H>  ----------------------------<  123<H>  4.2   |  21.0<L>  |  0.83    Ca    8.1<L>      16 Apr 2018 16:14  Mg     1.8     04-16      PT/INR - ( 16 Apr 2018 16:14 )   PT: 12.7 sec;   INR: 1.15 ratio         PTT - ( 16 Apr 2018 16:14 )  PTT:42.7 sec    RECENT CULTURES:        CARDIAC MARKERS ( 16 Apr 2018 16:14 )  x     / <0.01 ng/mL / x     / x     / x          CAPILLARY BLOOD GLUCOSE      RADIOLOGY & ADDITIONAL STUDIES:    ASSESSMENT/PLAN:  83yFemale presenting with:  Post op Rt TCAR with post op hypotension with SBP to 110's    SICU will continue to follow pt in PACU  Pt started on 0.7mg Neosynephrine and decreased to 0.3mg in an hour.  1L IVF given with good response in blood pressure.    Post op labs stable  Valera placed for urinary retention, 500cc's  Should pt not maintain blood pressure without Neosynephrine, will admit to SICU

## 2018-04-16 NOTE — BRIEF OPERATIVE NOTE - PROCEDURE
Carotid stent placement  04/16/2018  Trans Carotid Revascularization, on Right  Active  AKARMAKER <<-----Click on this checkbox to enter Procedure

## 2018-04-16 NOTE — PROGRESS NOTE ADULT - SUBJECTIVE AND OBJECTIVE BOX
Vascular Attending:        HPI:  83 year old female present with carotid stenosis. She is s/p right trans carotid artery revascularization, stent (TCAR). (10 Apr 2018 13:39)    Post operative follow up  Patient Alert  BP dependent on Ernst ggt  Patient is currently being evaluated for the ICU  s/p General anesthesia  Currently alert and oriented, no gross neurological compromise   No chest pain, sob, motor/sensory deficits   Patient unable to void post removal of joseph catheter       PAST MEDICAL & SURGICAL HISTORY:  Compression fracture of thoracic vertebra  Carotid stenosis  CAD (coronary artery disease)  MI (myocardial infarction): i stent placed  jan   GERD (gastroesophageal reflux disease)  Osteoporosis  H/O total knee replacement, left  S/P cataract extraction and insertion of intraocular lens  Stented coronary artery: jan 260 2017   dr bland  Feb 2017 2 stents  S/P knee replacement: left      	    MEDICATIONS  (STANDING):  aspirin  chewable 81 milliGRAM(s) Oral daily  atorvastatin 40 milliGRAM(s) Oral at bedtime  lactated ringers Bolus 500 milliLiter(s) IV Bolus once  lactated ringers. 1000 milliLiter(s) (85 mL/Hr) IV Continuous <Continuous>  pantoprazole    Tablet 40 milliGRAM(s) Oral before breakfast  phenylephrine    Infusion 0.708 MICROgram(s)/kG/Min (10 mL/Hr) IV Continuous <Continuous>  ticagrelor 90 milliGRAM(s) Oral two times a day  vancomycin  IVPB 1000 milliGRAM(s) IV Intermittent once    MEDICATIONS  (PRN):  acetaminophen   Tablet. 650 milliGRAM(s) Oral every 6 hours PRN Moderate Pain (4 - 6)  fentaNYL    Injectable 25 MICROGram(s) IV Push every 5 minutes PRN Moderate Pain  ondansetron Injectable 4 milliGRAM(s) IV Push once PRN Nausea and/or Vomiting  ondansetron Injectable 4 milliGRAM(s) IV Push every 6 hours PRN Nausea and/or Vomiting  traMADol 50 milliGRAM(s) Oral every 6 hours PRN Severe Pain (7 - 10)      Allergies    Duricef (Unknown)    Intolerances            Vital Signs Last 24 Hrs  T(C): 36.8 (16 Apr 2018 14:40), Max: 36.8 (16 Apr 2018 14:40)  T(F): 98.2 (16 Apr 2018 14:40), Max: 98.2 (16 Apr 2018 14:40)  HR: 66 (16 Apr 2018 18:00) (66 - 79)  BP: 144/59 (16 Apr 2018 18:00) (98/52 - 162/63)  BP(mean): --  RR: 16 (16 Apr 2018 18:00) (12 - 22)  SpO2: 100% (16 Apr 2018 18:00) (97% - 100%)    PHYSICAL EXAM:      Constitutional:  alert and oriented     Eyes:  EOMI    ENMT: midline tongue protrusion     Neck:  clean dressing in place     Respiratory: clear    Cardiovascular:  s1/s2    Gastrointestinal:  soft , nt       Extremities:  warm, right radial kevin, left radial palpable , equal bilateral  strenght         LABS:                        11.5   8.1   )-----------( 218      ( 16 Apr 2018 16:14 )             34.6     04-16    139  |  104  |  23.0<H>  ----------------------------<  123<H>  4.2   |  21.0<L>  |  0.83    Ca    8.1<L>      16 Apr 2018 16:14  Mg     1.8     04-16      PT/INR - ( 16 Apr 2018 16:14 )   PT: 12.7 sec;   INR: 1.15 ratio         PTT - ( 16 Apr 2018 16:14 )  PTT:42.7 sec      RADIOLOGY & ADDITIONAL STUDIES    Impression and Plan:    s/p TCAR, patient feeling well  no acute gross neurological compromise noted on exam  systolic blood pressure dependent on ernst ggt    - reinsert joseph catheter   - keep systolic bp above 140, admit to sicu if continuation of Ernst is needed  - neurological checks as ordered

## 2018-04-17 RX ORDER — SODIUM CHLORIDE 9 MG/ML
500 INJECTION, SOLUTION INTRAVENOUS ONCE
Qty: 0 | Refills: 0 | Status: COMPLETED | OUTPATIENT
Start: 2018-04-17 | End: 2018-04-17

## 2018-04-17 RX ORDER — SODIUM CHLORIDE 9 MG/ML
1000 INJECTION, SOLUTION INTRAVENOUS ONCE
Qty: 0 | Refills: 0 | Status: DISCONTINUED | OUTPATIENT
Start: 2018-04-17 | End: 2018-04-18

## 2018-04-17 RX ADMIN — TICAGRELOR 90 MILLIGRAM(S): 90 TABLET ORAL at 06:11

## 2018-04-17 RX ADMIN — PANTOPRAZOLE SODIUM 40 MILLIGRAM(S): 20 TABLET, DELAYED RELEASE ORAL at 06:11

## 2018-04-17 RX ADMIN — ATORVASTATIN CALCIUM 40 MILLIGRAM(S): 80 TABLET, FILM COATED ORAL at 21:29

## 2018-04-17 RX ADMIN — SODIUM CHLORIDE 1000 MILLILITER(S): 9 INJECTION, SOLUTION INTRAVENOUS at 11:51

## 2018-04-17 RX ADMIN — Medication 81 MILLIGRAM(S): at 11:49

## 2018-04-17 RX ADMIN — TICAGRELOR 90 MILLIGRAM(S): 90 TABLET ORAL at 18:34

## 2018-04-17 NOTE — PROGRESS NOTE ADULT - SUBJECTIVE AND OBJECTIVE BOX
INTERVAL HPI/OVERNIGHT EVENTS:  s/p transcarotid revasc. right side. requiring mario overnight.    PRESSORS: [ ] YES [ ] NO  WHICH:mario  DOSE:    ANTIBIOTICS:              x    DATE STARTED:  ANTIBIOTICS:                  DATE STARTED:  ANTIBIOTICS:                  DATE STARTED:    MEDICATIONS  (STANDING):  aspirin  chewable 81 milliGRAM(s) Oral daily  atorvastatin 40 milliGRAM(s) Oral at bedtime  pantoprazole    Tablet 40 milliGRAM(s) Oral before breakfast  phenylephrine    Infusion 0.708 MICROgram(s)/kG/Min (10 mL/Hr) IV Continuous <Continuous>  ticagrelor 90 milliGRAM(s) Oral two times a day    MEDICATIONS  (PRN):  acetaminophen   Tablet. 650 milliGRAM(s) Oral every 6 hours PRN Moderate Pain (4 - 6)  ondansetron Injectable 4 milliGRAM(s) IV Push every 6 hours PRN Nausea and/or Vomiting  traMADol 50 milliGRAM(s) Oral every 6 hours PRN Severe Pain (7 - 10)      Drug Dosing Weight  Height (cm): 175.26 (16 Apr 2018 22:00)  Weight (kg): 85.1 (16 Apr 2018 22:00)  BMI (kg/m2): 27.7 (16 Apr 2018 22:00)  BSA (m2): 2.01 (16 Apr 2018 22:00)    CENTRAL LINE: [ ] YES [ x] NO  LOCATION:   DATE INSERTED:  REMOVE: [ ] YES [ ] NO  EXPLAIN:    TAY: [x ] YES [ ] NO    DATE INSERTED:  REMOVE: [x ] YES [ ] NO  EXPLAIN:    A-LINE: [x ] YES [ ] NO  LOCATION:   DATE INSERTED:  REMOVE: [ ] YES x[ ] NO  EXPLAIN:    PAST MEDICAL & SURGICAL HISTORY:  Compression fracture of thoracic vertebra  Carotid stenosis  CAD (coronary artery disease)  MI (myocardial infarction): i stent placed  jan   GERD (gastroesophageal reflux disease)  Osteoporosis  H/O total knee replacement, left  S/P cataract extraction and insertion of intraocular lens  Stented coronary artery: jan 260 2017   dr bland  Feb 2017 2 stents  S/P knee replacement: left      ICU Vital Signs Last 24 Hrs  T(C): 37.8 (17 Apr 2018 08:00), Max: 37.8 (17 Apr 2018 08:00)  T(F): 100 (17 Apr 2018 08:00), Max: 100 (17 Apr 2018 08:00)  HR: 55 (17 Apr 2018 10:00) (54 - 92)  BP: 138/62 (17 Apr 2018 10:00) (98/52 - 164/73)  BP(mean): 89 (17 Apr 2018 10:00) (86 - 105)  ABP: 149/53 (17 Apr 2018 10:00) (102/45 - 180/71)  ABP(mean): 83 (17 Apr 2018 10:00) (59 - 83)  RR: 24 (17 Apr 2018 10:00) (10 - 27)  SpO2: 95% (17 Apr 2018 10:00) (93% - 100%)          I&O's Detail    16 Apr 2018 07:01  -  17 Apr 2018 07:00  --------------------------------------------------------  IN:    lactated ringers.: 595 mL    phenylephrine   Infusion: 148.7 mL  Total IN: 743.7 mL    OUT:    Bulb: 80 mL    Indwelling Catheter - Urethral: 2255 mL  Total OUT: 2335 mL    Total NET: -1591.3 mL      17 Apr 2018 07:01  -  17 Apr 2018 11:40  --------------------------------------------------------  IN:  Total IN: 0 mL    OUT:    Indwelling Catheter - Urethral: 260 mL  Total OUT: 260 mL    Total NET: -260 mL              Physical Exam:    Neurological:  No sensory/motor deficits, gcs 15    HEENT: PERRLA, EOMI, no drainage or redness    Neck: No bruits; no thyromegaly or nodules,  No JVD. no hematoma at surgical site.    Back: Normal spine flexure, No CVA tenderness, No deformity or limitation of movement    Respiratory: Breath Sounds equal & clear to auscultation, no accessory muscle use    Cardiovascular:; no murmurs, gallops or rubs    Gastrointestinal: Soft, non-tender, normal bowel sounds    Extremities: No peripheral edema, No cyanosis, clubbing     Vascular: Equal and normal pulses: 2+ peripheral pulses throughout    Musculoskeletal: No joint pain, swelling or deformity; no limitation of movement    Skin: No rashes    LABS:  CBC Full  -  ( 17 Apr 2018 04:48 )  WBC Count : 9.8 K/uL  Hemoglobin : 10.4 g/dL  Hematocrit : 31.9 %  Platelet Count - Automated : 263 K/uL  Mean Cell Volume : 94.9 fl  Mean Cell Hemoglobin : 31.0 pg  Mean Cell Hemoglobin Concentration : 32.6 g/dL  Auto Neutrophil # : 7.9 K/uL  Auto Lymphocyte # : 1.1 K/uL  Auto Monocyte # : 0.8 K/uL  Auto Eosinophil # : 0.0 K/uL  Auto Basophil # : 0.0 K/uL  Auto Neutrophil % : 80.9 %  Auto Lymphocyte % : 11.2 %  Auto Monocyte % : 7.6 %  Auto Eosinophil % : 0.0 %  Auto Basophil % : 0.1 %    04-17    141  |  106  |  18.0  ----------------------------<  125<H>  4.1   |  22.0  |  0.82    Ca    8.3<L>      17 Apr 2018 04:48  Phos  4.1     04-17  Mg     1.8     04-17      PT/INR - ( 16 Apr 2018 16:14 )   PT: 12.7 sec;   INR: 1.15 ratio         PTT - ( 16 Apr 2018 16:14 )  PTT:42.7 sec      Assessment and Plan:    Neuro: GCS 15. Monitor for delirium.  Continue to optimize pain control. Serial Neurologic assessments    HEENT: no issues    CV: Continue hemodynamic monitoring, as per vascular keep sbp 140's , currently requiring low dose mario to maintain the goals.     Pulm: Pulmonary toilet.  Continue incentive spirometer.  Chest PT.  Encourage OOB to chair and ambulation     GI/Nutrition: Bowel Regimen    /Renal: monitor UOP. Monitor BMP.  Replete Lytes as needed      HEME- DVT prophylaxis, SCDs, keep asa / Brilinta.     ID: x    Lines/Tubes: x    Endo: x    Skin: x    Dispo: sicu    critical care time: 40 min.

## 2018-04-17 NOTE — PROGRESS NOTE ADULT - SUBJECTIVE AND OBJECTIVE BOX
Patient is a 83y old  Female who presents with a chief complaint of I have a blockage in my neck (10 Apr 2018 14:01)    Pt is S/P R TCAR with stent       POD# 1  No complaints of headache, dizziness, visual disturbance or difficulty with speech or swallowing  Required mario overnight to maintain SBP>140  Feels well this am    Vital Signs Last 24 Hrs  T(C): 37.8 (17 Apr 2018 08:00), Max: 37.8 (17 Apr 2018 08:00)  T(F): 100 (17 Apr 2018 08:00), Max: 100 (17 Apr 2018 08:00)  HR: 55 (17 Apr 2018 10:00) (54 - 92)  BP: 138/62 (17 Apr 2018 10:00) (98/52 - 164/73)  BP(mean): 89 (17 Apr 2018 10:00) (86 - 105)  RR: 24 (17 Apr 2018 10:00) (10 - 27)  SpO2: 95% (17 Apr 2018 10:00) (93% - 100%)  I&O's Detail    16 Apr 2018 07:01  -  17 Apr 2018 07:00  --------------------------------------------------------  IN:    lactated ringers.: 595 mL    phenylephrine   Infusion: 148.7 mL  Total IN: 743.7 mL    OUT:    Bulb: 80 mL    Indwelling Catheter - Urethral: 2255 mL  Total OUT: 2335 mL    Total NET: -1591.3 mL      17 Apr 2018 07:01  -  17 Apr 2018 10:07  --------------------------------------------------------  IN:  Total IN: 0 mL    OUT:    Indwelling Catheter - Urethral: 260 mL  Total OUT: 260 mL    Total NET: -260 mL    PAST MEDICAL & SURGICAL HISTORY:  Compression fracture of thoracic vertebra  Carotid stenosis  CAD (coronary artery disease)  MI (myocardial infarction): i stent placed  jan   GERD (gastroesophageal reflux disease)  Osteoporosis  H/O total knee replacement, left  S/P cataract extraction and insertion of intraocular lens  Stented coronary artery: jan 260 2017   dr bland  Feb 2017 2 stents  S/P knee replacement: left    MEDICATIONS  (STANDING):  aspirin  chewable 81 milliGRAM(s) Oral daily  atorvastatin 40 milliGRAM(s) Oral at bedtime  pantoprazole    Tablet 40 milliGRAM(s) Oral before breakfast  phenylephrine    Infusion 0.708 MICROgram(s)/kG/Min (10 mL/Hr) IV Continuous <Continuous>  ticagrelor 90 milliGRAM(s) Oral two times a day    MEDICATIONS  (PRN):  acetaminophen   Tablet. 650 milliGRAM(s) Oral every 6 hours PRN Moderate Pain (4 - 6)  ondansetron Injectable 4 milliGRAM(s) IV Push every 6 hours PRN Nausea and/or Vomiting  traMADol 50 milliGRAM(s) Oral every 6 hours PRN Severe Pain (7 - 10)    Physical Exam:  General: NAD, resting comfortably in bed  Neuro: A&O x 3, speech fluent, face symmetrical. R neck incision CDI with steris inplace. No erythema. Small soft/stable hematoma with min ecchymosis. CURTIS drain with sc serosang fluid. DC'd without difficulty. Pt with sm bleeder from scalp(monitorin) and this was cleaned and closed with dermabond.  Pulmonary: Nonlabored breathing, CTA  Cardiovascular: Normal S1, S2  Abdominal: soft, NT/ND  Extremities: WWP      LABS:                        10.4   9.8   )-----------( 263      ( 17 Apr 2018 04:48 )             31.9     04-17    141  |  106  |  18.0  ----------------------------<  125<H>  4.1   |  22.0  |  0.82    Ca    8.3<L>      17 Apr 2018 04:48  Phos  4.1     04-17  Mg     1.8     04-17      PT/INR - ( 16 Apr 2018 16:14 )   PT: 12.7 sec;   INR: 1.15 ratio    PTT - ( 16 Apr 2018 16:14 )  PTT:42.7 sec      Assessment:83yFemaleHPI:  83 year old female present with carotid stenosis. She is schedule for right trans carotid artery revascularization possible carotid endarterectomy. (10 Apr 2018 13:39)  S/P R TCAR with stent       POD# 1  Periop hypotension requiring vasopressor  Neurologically intact    Plan:  Cont ASA and Brilinta  Cont Statin  Wean mario. Maintain SBP > 140  OOB/Ambulate/PT  DC joseph  Can be down graded to 3t in off mario  Possible dc home in am  Seen and discussed with Dr. Karen Patiño

## 2018-04-18 ENCOUNTER — TRANSCRIPTION ENCOUNTER (OUTPATIENT)
Age: 83
End: 2018-04-18

## 2018-04-18 VITALS
RESPIRATION RATE: 23 BRPM | SYSTOLIC BLOOD PRESSURE: 93 MMHG | HEART RATE: 69 BPM | OXYGEN SATURATION: 100 % | DIASTOLIC BLOOD PRESSURE: 49 MMHG

## 2018-04-18 DIAGNOSIS — I65.22 OCCLUSION AND STENOSIS OF LEFT CAROTID ARTERY: ICD-10-CM

## 2018-04-18 LAB
ANION GAP SERPL CALC-SCNC: 13 MMOL/L — SIGNIFICANT CHANGE UP (ref 5–17)
BASOPHILS # BLD AUTO: 0 K/UL — SIGNIFICANT CHANGE UP (ref 0–0.2)
BASOPHILS NFR BLD AUTO: 0.5 % — SIGNIFICANT CHANGE UP (ref 0–2)
BUN SERPL-MCNC: 20 MG/DL — SIGNIFICANT CHANGE UP (ref 8–20)
CALCIUM SERPL-MCNC: 8.1 MG/DL — LOW (ref 8.6–10.2)
CHLORIDE SERPL-SCNC: 109 MMOL/L — HIGH (ref 98–107)
CO2 SERPL-SCNC: 22 MMOL/L — SIGNIFICANT CHANGE UP (ref 22–29)
CREAT SERPL-MCNC: 0.86 MG/DL — SIGNIFICANT CHANGE UP (ref 0.5–1.3)
EOSINOPHIL # BLD AUTO: 0.1 K/UL — SIGNIFICANT CHANGE UP (ref 0–0.5)
EOSINOPHIL NFR BLD AUTO: 1 % — SIGNIFICANT CHANGE UP (ref 0–6)
GLUCOSE SERPL-MCNC: 103 MG/DL — SIGNIFICANT CHANGE UP (ref 70–115)
HCT VFR BLD CALC: 28.3 % — LOW (ref 37–47)
HGB BLD-MCNC: 9.2 G/DL — LOW (ref 12–16)
LYMPHOCYTES # BLD AUTO: 2.7 K/UL — SIGNIFICANT CHANGE UP (ref 1–4.8)
LYMPHOCYTES # BLD AUTO: 34 % — SIGNIFICANT CHANGE UP (ref 20–55)
MAGNESIUM SERPL-MCNC: 2 MG/DL — SIGNIFICANT CHANGE UP (ref 1.6–2.6)
MCHC RBC-ENTMCNC: 31.2 PG — HIGH (ref 27–31)
MCHC RBC-ENTMCNC: 32.5 G/DL — SIGNIFICANT CHANGE UP (ref 32–36)
MCV RBC AUTO: 95.9 FL — SIGNIFICANT CHANGE UP (ref 81–99)
MONOCYTES # BLD AUTO: 1.1 K/UL — HIGH (ref 0–0.8)
MONOCYTES NFR BLD AUTO: 13.3 % — HIGH (ref 3–10)
NEUTROPHILS # BLD AUTO: 4.1 K/UL — SIGNIFICANT CHANGE UP (ref 1.8–8)
NEUTROPHILS NFR BLD AUTO: 51.1 % — SIGNIFICANT CHANGE UP (ref 37–73)
PHOSPHATE SERPL-MCNC: 2.5 MG/DL — SIGNIFICANT CHANGE UP (ref 2.4–4.7)
PLATELET # BLD AUTO: 222 K/UL — SIGNIFICANT CHANGE UP (ref 150–400)
POTASSIUM SERPL-MCNC: 4 MMOL/L — SIGNIFICANT CHANGE UP (ref 3.5–5.3)
POTASSIUM SERPL-SCNC: 4 MMOL/L — SIGNIFICANT CHANGE UP (ref 3.5–5.3)
RBC # BLD: 2.95 M/UL — LOW (ref 4.4–5.2)
RBC # FLD: 14.1 % — SIGNIFICANT CHANGE UP (ref 11–15.6)
SODIUM SERPL-SCNC: 144 MMOL/L — SIGNIFICANT CHANGE UP (ref 135–145)
WBC # BLD: 8 K/UL — SIGNIFICANT CHANGE UP (ref 4.8–10.8)
WBC # FLD AUTO: 8 K/UL — SIGNIFICANT CHANGE UP (ref 4.8–10.8)

## 2018-04-18 RX ORDER — ACETAMINOPHEN 500 MG
2 TABLET ORAL
Qty: 0 | Refills: 0 | COMMUNITY
Start: 2018-04-18

## 2018-04-18 RX ADMIN — PANTOPRAZOLE SODIUM 40 MILLIGRAM(S): 20 TABLET, DELAYED RELEASE ORAL at 06:06

## 2018-04-18 RX ADMIN — Medication 81 MILLIGRAM(S): at 11:49

## 2018-04-18 RX ADMIN — TICAGRELOR 90 MILLIGRAM(S): 90 TABLET ORAL at 06:06

## 2018-04-18 NOTE — DISCHARGE NOTE ADULT - CARE PROVIDER_API CALL
Bartolome Lyons), Surgery; Vascular Surgery  [address] [facility]  [address]st [place] [address]  Phone: [phone]  Fax: [phone]    Triston Mejia), Cardiovascular Disease; Interventional Cardiology  [address] [facility]  [address]  [place] [address]  Phone: [phone]  Fax: [phone]    Dr Estefanía  Phone: [phone]  Fax:     -

## 2018-04-18 NOTE — DISCHARGE NOTE ADULT - HOSPITAL COURSE
83 year old female present with carotid stenosis admitted same day for revascularization. Pt taken to the OR on 4/16/18 by Dr Karen Patiño and underwent R TCAR with stent and cerebral monitoring. Pt tolerated procedure well and awoke neurologically intact. She was mildly hypotensive and placed on vasopressors to maintain SBP> 140 to help with cerebral perfusion. She was monitored in the SICU and did well. Her mario was weaned off after 24 hours and she maintained acceptable MAPS. She had no neurologic deficits and was OOB to chair. She is hemodynamically stable and is ready for dc home.

## 2018-04-18 NOTE — DISCHARGE NOTE ADULT - MEDICATION SUMMARY - MEDICATIONS TO TAKE
I will START or STAY ON the medications listed below when I get home from the hospital:    aspirin 81 mg oral tablet, chewable  -- 1 tab(s) by mouth once a day  -- Indication: For Carotid stenosis    acetaminophen 325 mg oral tablet  -- 2 tab(s) by mouth every 6 hours, As needed, Moderate Pain (4 - 6)  -- Indication: For Pain    atorvastatin 40 mg oral tablet  -- 1 tab(s) by mouth once a day (at bedtime)  -- Indication: For HLD    ticagrelor 90 mg oral tablet  -- 1 tab(s) by mouth 2 times a day  -- Indication: For Carotid stenosis    pantoprazole 40 mg oral delayed release tablet  -- 1 tab(s) by mouth once a day (before a meal)  -- Indication: For GERD

## 2018-04-18 NOTE — DISCHARGE NOTE ADULT - VISION (WITH CORRECTIVE LENSES IF THE PATIENT USUALLY WEARS THEM):
Normal vision: sees adequately in most situations; can see medication labels, newsprint/glasses  ( Reading)

## 2018-04-18 NOTE — DISCHARGE NOTE ADULT - PROVIDER TOKENS
TOKEN:'[id number]',TOKEN:'[id number]',FREE:[LAST:[[email or web address]],FIRST:[],PHONE:[phone]],FAX:[(   )    -]]

## 2018-04-18 NOTE — PHYSICAL THERAPY INITIAL EVALUATION ADULT - ADDITIONAL COMMENTS
pt states she lives alone in a 1-story house with 2 steps to enter (2 rails). she is independent with all mobility prior to admit without a device. states she has a cane at home if needed. +driving. +volunteers at [employer] several days/week. has a good friend to assist as needed.

## 2018-04-18 NOTE — DISCHARGE NOTE ADULT - CARE PROVIDERS DIRECT ADDRESSES
[email or web address],DirectAddress_Unknown,DirectAddress_Unknown

## 2018-04-18 NOTE — PROGRESS NOTE ADULT - SUBJECTIVE AND OBJECTIVE BOX
Patient is a 83y old  Female who presents with a chief complaint of I have a blockage in my neck (10 Apr 2018 14:01)    Pt is S/P R TCAR with stent       POD# 2  No complaints of headache, dizziness, visual disturbance or difficulty with speech or swallowing  Required mario overnight to maintain SBP>140. Off now and SBP >/=120  Feels well this am. Wants to go home    ICU Vital Signs Last 24 Hrs  T(C): 36.9 (18 Apr 2018 08:00), Max: 37.7 (17 Apr 2018 12:00)  T(F): 98.4 (18 Apr 2018 08:00), Max: 99.9 (17 Apr 2018 12:00)  HR: 64 (18 Apr 2018 08:00) (55 - 89)  BP: 112/53 (18 Apr 2018 08:00) (100/52 - 163/70)  BP(mean): 77 (18 Apr 2018 08:00) (72 - 100)  ABP: 129/45 (18 Apr 2018 08:00) (94/35 - 157/64)  ABP(mean): 70 (18 Apr 2018 08:00) (55 - 98)  RR: 11 (18 Apr 2018 08:00) (11 - 37)  SpO2: 96% (18 Apr 2018 08:00) (81% - 100%)    PAST MEDICAL & SURGICAL HISTORY:  Compression fracture of thoracic vertebra  Carotid stenosis  CAD (coronary artery disease)  MI (myocardial infarction): i stent placed  jan   GERD (gastroesophageal reflux disease)  Osteoporosis  H/O total knee replacement, left  S/P cataract extraction and insertion of intraocular lens  Stented coronary artery: jan 260 2017   dr bland  Feb 2017 2 stents  S/P knee replacement: left    MEDICATIONS  (STANDING):  aspirin  chewable 81 milliGRAM(s) Oral daily  atorvastatin 40 milliGRAM(s) Oral at bedtime  multiple electrolytes Injection Type 1 Bolus 1000 milliLiter(s) IV Bolus once  pantoprazole    Tablet 40 milliGRAM(s) Oral before breakfast  phenylephrine    Infusion 0.708 MICROgram(s)/kG/Min (10 mL/Hr) IV Continuous <Continuous>  ticagrelor 90 milliGRAM(s) Oral two times a day    MEDICATIONS  (PRN):  acetaminophen   Tablet. 650 milliGRAM(s) Oral every 6 hours PRN Moderate Pain (4 - 6)  ondansetron Injectable 4 milliGRAM(s) IV Push every 6 hours PRN Nausea and/or Vomiting  traMADol 50 milliGRAM(s) Oral every 6 hours PRN Severe Pain (7 - 10)    Physical Exam:  General: NAD, resting comfortably in bed  Neuro: A&O x 3, speech fluent, face symmetrical. R neck incisionwith steris in place. Old serosang staining noted. No erythema. Small soft/stable hematoma with ecchymosis.   Pulmonary: Nonlabored breathing, CTA  Cardiovascular: Normal S1, S2  Abdominal: soft, NT/ND  Extremities: WWP      LABS:                        9.2    8.0   )-----------( 222      ( 18 Apr 2018 04:44 )             28.3   04-18    144  |  109<H>  |  20.0  ----------------------------<  103  4.0   |  22.0  |  0.86    Ca    8.1<L>      18 Apr 2018 04:44  Phos  2.5     04-18  Mg     2.0     04-18        Assessment:83yFemaleHPI:  83 year old female present with carotid stenosis. She is schedule for right trans carotid artery revascularization possible carotid endarterectomy. (10 Apr 2018 13:39)  S/P R TCAR with stent       POD# 2  Periop hypotension requiring vasopressor to maintain higher MAPS for cerebral perfusion  Neurologically intact    Plan:  Cont ASA and Brilinta  Cont Statin  OOB/Ambulate/PT  dc home today  Pt seen and case discussed with Dr. Karen Patiño

## 2018-04-18 NOTE — DIETITIAN INITIAL EVALUATION ADULT. - PERTINENT LABORATORY DATA
04-18 Na144 mmol/L Glu 103 mg/dL K+ 4.0 mmol/L Cr  0.86 mg/dL BUN 20.0 mg/dL Phos 2.5 mg/dL Alb n/a   PAB n/a

## 2018-04-18 NOTE — PROGRESS NOTE ADULT - SUBJECTIVE AND OBJECTIVE BOX
INTERVAL HPI/OVERNIGHT EVENTS:  no issues overnight     PRESSORS: [x ] YES [ ] NO  WHICH: mario    ANTIBIOTICS:            x      DATE STARTED:  ANTIBIOTICS:                  DATE STARTED:  ANTIBIOTICS:                  DATE STARTED:    MEDICATIONS  (STANDING):  aspirin  chewable 81 milliGRAM(s) Oral daily  atorvastatin 40 milliGRAM(s) Oral at bedtime  multiple electrolytes Injection Type 1 Bolus 1000 milliLiter(s) IV Bolus once  pantoprazole    Tablet 40 milliGRAM(s) Oral before breakfast  ticagrelor 90 milliGRAM(s) Oral two times a day    MEDICATIONS  (PRN):  acetaminophen   Tablet. 650 milliGRAM(s) Oral every 6 hours PRN Moderate Pain (4 - 6)  ondansetron Injectable 4 milliGRAM(s) IV Push every 6 hours PRN Nausea and/or Vomiting  traMADol 50 milliGRAM(s) Oral every 6 hours PRN Severe Pain (7 - 10)      Drug Dosing Weight  Height (cm): 175.26 (16 Apr 2018 22:00)  Weight (kg): 85.1 (16 Apr 2018 22:00)  BMI (kg/m2): 27.7 (16 Apr 2018 22:00)  BSA (m2): 2.01 (16 Apr 2018 22:00)    CENTRAL LINE: [ ] YES x[ ] NO  LOCATION:   DATE INSERTED:  REMOVE: [ ] YES [ ] NO  EXPLAIN:    TAY: [ ] YES [x ] NO    DATE INSERTED:  REMOVE: [ ] YES [ ] NO  EXPLAIN:    A-LINE: [ ] YES [x ] NO  LOCATION:   DATE INSERTED:  REMOVE: [ ] YES [ ] NO  EXPLAIN:    PAST MEDICAL & SURGICAL HISTORY:  Compression fracture of thoracic vertebra  Carotid stenosis  CAD (coronary artery disease)  MI (myocardial infarction): i stent placed  jan   GERD (gastroesophageal reflux disease)  Osteoporosis  H/O total knee replacement, left  S/P cataract extraction and insertion of intraocular lens  Stented coronary artery: jan 260 2017   dr bland  Feb 2017 2 stents  S/P knee replacement: left      ICU Vital Signs Last 24 Hrs  T(C): 37 (18 Apr 2018 12:00), Max: 37 (18 Apr 2018 12:00)  T(F): 98.6 (18 Apr 2018 12:00), Max: 98.6 (18 Apr 2018 12:00)  HR: 71 (18 Apr 2018 11:00) (57 - 77)  BP: 101/50 (18 Apr 2018 10:00) (100/52 - 163/70)  BP(mean): 72 (18 Apr 2018 10:00) (72 - 100)  ABP: 144/52 (18 Apr 2018 11:00) (94/35 - 157/54)  ABP(mean): 82 (18 Apr 2018 11:00) (47 - 94)  RR: 30 (18 Apr 2018 11:00) (11 - 37)  SpO2: 100% (18 Apr 2018 11:00) (85% - 100%)          I&O's Detail    17 Apr 2018 07:01  -  18 Apr 2018 07:00  --------------------------------------------------------  IN:    IV PiggyBack: 500 mL    Oral Fluid: 240 mL    phenylephrine   Infusion: 392.2 mL  Total IN: 1132.2 mL    OUT:    Bulb: 30 mL    Indwelling Catheter - Urethral: 540 mL    Voided: 1350 mL  Total OUT: 1920 mL    Total NET: -787.8 mL              Physical Exam:    Neurological:  No sensory/motor deficits gcs 15.    HEENT: PERRLA, EOMI, no drainage or redness    Neck: No bruits; no thyromegaly or nodules,  No JVD    Respiratory: Breath Sounds equal & clear to auscultation, no accessory muscle use    Cardiovascular: Regular rate & rhythm, normal S1, S2; no murmurs, gallops or rubs    Gastrointestinal: Soft, non-tender, normal bowel sounds    Extremities: No peripheral edema, No cyanosis, clubbing     Vascular: Equal and normal pulses: 2+ peripheral pulses throughout    Musculoskeletal: No joint pain, swelling or deformity; no limitation of movement    Skin: No rashes    LABS:  CBC Full  -  ( 18 Apr 2018 04:44 )  WBC Count : 8.0 K/uL  Hemoglobin : 9.2 g/dL  Hematocrit : 28.3 %  Platelet Count - Automated : 222 K/uL  Mean Cell Volume : 95.9 fl  Mean Cell Hemoglobin : 31.2 pg  Mean Cell Hemoglobin Concentration : 32.5 g/dL  Auto Neutrophil # : 4.1 K/uL  Auto Lymphocyte # : 2.7 K/uL  Auto Monocyte # : 1.1 K/uL  Auto Eosinophil # : 0.1 K/uL  Auto Basophil # : 0.0 K/uL  Auto Neutrophil % : 51.1 %  Auto Lymphocyte % : 34.0 %  Auto Monocyte % : 13.3 %  Auto Eosinophil % : 1.0 %  Auto Basophil % : 0.5 %    04-18    144  |  109<H>  |  20.0  ----------------------------<  103  4.0   |  22.0  |  0.86    Ca    8.1<L>      18 Apr 2018 04:44  Phos  2.5     04-18  Mg     2.0     04-18      PT/INR - ( 16 Apr 2018 16:14 )   PT: 12.7 sec;   INR: 1.15 ratio         PTT - ( 16 Apr 2018 16:14 )  PTT:42.7 sec      Assessment and Plan:    Neuro:   Continue to optimize pain control. Serial Neurologic assessments    HEENT: no issues    CV: may stop mario, no longer needs to meet sbp requirements, may d/c  hemodynamic monitoring    Pulm: Pulmonary toilet.  Continue incentive spirometer.  Chest PT.  Encourage OOB to chair and ambulation     GI/Nutrition: Bowel Regimen    /Renal:   Replete Lytes as needed      HEME- DVT prophylaxis, SCDs    ID: x    Lines/Tubes: x  x  Endo: x    Skin: x    Dispo: cleared for discharge to home.

## 2018-04-18 NOTE — DISCHARGE NOTE ADULT - NS AS ACTIVITY OBS
Walking-Indoors allowed/Showering allowed/Walking-Outdoors allowed/Do not drive or operate machinery/No Heavy lifting/straining/Stairs allowed

## 2018-04-18 NOTE — DISCHARGE NOTE ADULT - CARE PLAN
Principal Discharge DX:	Carotid stenosis  Goal:	Improved circulation and stroke risk reduction  Assessment and plan of treatment:	May shower daily. Remove dressing prior. Wash incision with soap and water and pat dry. Leave open to air. No ointments, powders or creams to incision. Monitor incision for any redness, swelling or drainage and call office immediately with any concerns. Follow up with Dr Karen Patiño in office in 1week. Office will call with appointment. Office number [phone]. Follow up with cardiologist in 1-2 weeks.

## 2018-04-18 NOTE — DISCHARGE NOTE ADULT - PLAN OF CARE
Improved circulation and stroke risk reduction May shower daily. Remove dressing prior. Wash incision with soap and water and pat dry. Leave open to air. No ointments, powders or creams to incision. Monitor incision for any redness, swelling or drainage and call office immediately with any concerns. Follow up with Dr Karen Patiño in office in 1week. Office will call with appointment. Office number [phone]. Follow up with cardiologist in 1-2 weeks.

## 2018-04-18 NOTE — DISCHARGE NOTE ADULT - PATIENT PORTAL LINK FT
You can access the [facility] Patient Portal, offered by [facility], by registering with the following website: [email or web address]

## 2018-04-18 NOTE — DISCHARGE NOTE ADULT - MEDICATION SUMMARY - MEDICATIONS TO STOP TAKING
I will STOP taking the medications listed below when I get home from the hospital:    metoprolol tartrate 25 mg oral tablet  -- 1 tab(s) by mouth 3 times a day

## 2018-04-23 ENCOUNTER — APPOINTMENT (OUTPATIENT)
Dept: VASCULAR SURGERY | Facility: CLINIC | Age: 83
End: 2018-04-23
Payer: MEDICARE

## 2018-04-23 PROCEDURE — 99024 POSTOP FOLLOW-UP VISIT: CPT

## 2018-04-23 RX ORDER — HYDROCORTISONE 25 MG/G
2.5 CREAM TOPICAL
Qty: 28 | Refills: 0 | Status: ACTIVE | COMMUNITY
Start: 2017-10-23

## 2018-04-23 RX ORDER — METOPROLOL TARTRATE 25 MG/1
25 TABLET, FILM COATED ORAL
Refills: 0 | Status: DISCONTINUED | COMMUNITY
End: 2018-04-23

## 2018-05-22 ENCOUNTER — APPOINTMENT (OUTPATIENT)
Dept: VASCULAR SURGERY | Facility: CLINIC | Age: 83
End: 2018-05-22
Payer: MEDICARE

## 2018-05-22 VITALS
HEART RATE: 70 BPM | SYSTOLIC BLOOD PRESSURE: 114 MMHG | HEIGHT: 66 IN | WEIGHT: 164 LBS | DIASTOLIC BLOOD PRESSURE: 58 MMHG | BODY MASS INDEX: 26.36 KG/M2 | TEMPERATURE: 97.7 F | OXYGEN SATURATION: 100 % | RESPIRATION RATE: 16 BRPM

## 2018-05-22 PROCEDURE — 99024 POSTOP FOLLOW-UP VISIT: CPT

## 2018-05-23 PROCEDURE — 84100 ASSAY OF PHOSPHORUS: CPT

## 2018-05-23 PROCEDURE — 36415 COLL VENOUS BLD VENIPUNCTURE: CPT

## 2018-05-23 PROCEDURE — 76000 FLUOROSCOPY <1 HR PHYS/QHP: CPT

## 2018-05-23 PROCEDURE — C1876: CPT

## 2018-05-23 PROCEDURE — 83735 ASSAY OF MAGNESIUM: CPT

## 2018-05-23 PROCEDURE — 85027 COMPLETE CBC AUTOMATED: CPT

## 2018-05-23 PROCEDURE — 80048 BASIC METABOLIC PNL TOTAL CA: CPT

## 2018-05-23 PROCEDURE — 85610 PROTHROMBIN TIME: CPT

## 2018-05-23 PROCEDURE — C1884: CPT

## 2018-05-23 PROCEDURE — C1725: CPT

## 2018-05-23 PROCEDURE — 97163 PT EVAL HIGH COMPLEX 45 MIN: CPT

## 2018-05-23 PROCEDURE — 86923 COMPATIBILITY TEST ELECTRIC: CPT

## 2018-05-23 PROCEDURE — 85730 THROMBOPLASTIN TIME PARTIAL: CPT

## 2018-05-23 PROCEDURE — 84484 ASSAY OF TROPONIN QUANT: CPT

## 2018-06-25 ENCOUNTER — APPOINTMENT (OUTPATIENT)
Dept: VASCULAR SURGERY | Facility: CLINIC | Age: 83
End: 2018-06-25
Payer: MEDICARE

## 2018-06-25 VITALS
SYSTOLIC BLOOD PRESSURE: 166 MMHG | BODY MASS INDEX: 26.36 KG/M2 | OXYGEN SATURATION: 100 % | HEIGHT: 66 IN | WEIGHT: 164 LBS | DIASTOLIC BLOOD PRESSURE: 71 MMHG | TEMPERATURE: 98.2 F | HEART RATE: 70 BPM

## 2018-06-25 PROCEDURE — 93880 EXTRACRANIAL BILAT STUDY: CPT

## 2018-06-25 PROCEDURE — 99024 POSTOP FOLLOW-UP VISIT: CPT

## 2019-01-25 PROBLEM — S22.000A WEDGE COMPRESSION FRACTURE OF UNSPECIFIED THORACIC VERTEBRA, INITIAL ENCOUNTER FOR CLOSED FRACTURE: Chronic | Status: ACTIVE | Noted: 2018-04-10

## 2019-01-25 PROBLEM — I65.29 OCCLUSION AND STENOSIS OF UNSPECIFIED CAROTID ARTERY: Chronic | Status: ACTIVE | Noted: 2018-04-10

## 2019-01-25 PROBLEM — I25.10 ATHEROSCLEROTIC HEART DISEASE OF NATIVE CORONARY ARTERY WITHOUT ANGINA PECTORIS: Chronic | Status: ACTIVE | Noted: 2017-02-17

## 2019-02-26 ENCOUNTER — APPOINTMENT (OUTPATIENT)
Dept: VASCULAR SURGERY | Facility: CLINIC | Age: 84
End: 2019-02-26
Payer: MEDICARE

## 2019-02-26 VITALS
DIASTOLIC BLOOD PRESSURE: 79 MMHG | HEIGHT: 66 IN | SYSTOLIC BLOOD PRESSURE: 138 MMHG | OXYGEN SATURATION: 98 % | BODY MASS INDEX: 27 KG/M2 | WEIGHT: 168 LBS | HEART RATE: 72 BPM | TEMPERATURE: 98.3 F

## 2019-02-26 DIAGNOSIS — I83.90 ASYMPTOMATIC VARICOSE VEINS OF UNSPECIFIED LOWER EXTREMITY: ICD-10-CM

## 2019-02-26 PROCEDURE — 93880 EXTRACRANIAL BILAT STUDY: CPT

## 2019-02-26 PROCEDURE — 99213 OFFICE O/P EST LOW 20 MIN: CPT

## 2019-03-12 ENCOUNTER — APPOINTMENT (OUTPATIENT)
Dept: VASCULAR SURGERY | Facility: CLINIC | Age: 84
End: 2019-03-12
Payer: MEDICARE

## 2019-03-12 VITALS
RESPIRATION RATE: 16 BRPM | SYSTOLIC BLOOD PRESSURE: 134 MMHG | OXYGEN SATURATION: 100 % | DIASTOLIC BLOOD PRESSURE: 80 MMHG | TEMPERATURE: 98.6 F | WEIGHT: 170 LBS | BODY MASS INDEX: 27.32 KG/M2 | HEIGHT: 66 IN | HEART RATE: 75 BPM

## 2019-03-12 DIAGNOSIS — Z98.890 OTHER SPECIFIED POSTPROCEDURAL STATES: ICD-10-CM

## 2019-03-12 PROCEDURE — 99214 OFFICE O/P EST MOD 30 MIN: CPT

## 2019-03-12 PROCEDURE — 93970 EXTREMITY STUDY: CPT

## 2019-03-12 RX ORDER — METOPROLOL SUCCINATE 25 MG/1
25 TABLET, EXTENDED RELEASE ORAL
Qty: 270 | Refills: 0 | Status: DISCONTINUED | COMMUNITY
Start: 2017-05-25 | End: 2019-03-12

## 2019-03-12 NOTE — PHYSICAL EXAM
[Alert] : alert [Oriented to Person] : oriented to person [Oriented to Place] : oriented to place [Oriented to Time] : oriented to time [Calm] : calm [2+] : left 2+ [Varicose Veins Of Lower Extremities] : bilaterally [Ankle Swelling Bilaterally] : severe [Ankle Swelling (On Exam)] : not present [] : not present [de-identified] : WD, WN, NAD. Awake, alert, interactive. Ambulates without difficulty [de-identified] : NAYELI, PERROSAURAL [de-identified] : supple [FreeTextEntry1] : numerous tortuous varicose veins, medially to BLE from thighs to distal calves. Good palpable pulses. [de-identified] : no cyanosis or deformity. full ROM, MS 5/5\par  [de-identified] : DEYANIRA

## 2019-03-12 NOTE — PROCEDURE
[FreeTextEntry1] : U/S Carotid Duplex 2/26/19 demonstrates R- patent carotid artery stent without evidence of InStent stenosis. Peak systolic velocities are consistent with the absence of hemodynamically significant stenosis.  Left - there is fibrocalcific plaque with acoustic shadowing in the carotid bulb and P ICA. Peak systolic velocities are consistent with the absence of hemodynamically significant stenosis. However, cannot r/o presence of greater degree of stenosis due to limited visualization. Vertebral flow cephalad and flow velocities WNL b/l.\par \par U/S Venous Duplex 3/12/19 demonstrates BLE tortuous tributaries Right - 12.1 mm and Left - 6.1 mm.

## 2019-03-12 NOTE — ASSESSMENT
[FreeTextEntry1] : 85 y/o female S/P TCAR 4/16/18. U/S venous duplex demonstrates tortuous tributaries BLE. She will need BLE stab phlebectomies.  We will begin with the right leg. She would like to arrange some obligations and contact us to arrange the procedure. Risks Vs benefits and recovery discussed.\par

## 2019-03-12 NOTE — HISTORY OF PRESENT ILLNESS
[FreeTextEntry1] : 83 y/o female S/P R TCAR with stent 4/16/18. She is here today to have varicose veins evaluated. She reports fatigue and swelling to legs. She reports symptoms are generally better in the morning and worsen as the day goes by. She may awaken with squeezing pain to medial things around tortuous varicose veins. pain will resolve quickly. 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 She has utilized compression stockings in the past which were ineffective. She did wear Flex tights which are tights with compression for many years until she could no longer find them. She walks daily. She eats a balanced diet but does not drink as much as she should.  She taking ASA 81 mg, Brilinta 90 mg BID and Atorvastatin 40 mg QHS. Of note: her mother had severe varicose veins as well.

## 2019-03-25 LAB
ALBUMIN SERPL ELPH-MCNC: 4.1 G/DL
ALP BLD-CCNC: 88 U/L
ALT SERPL-CCNC: 36 U/L
ANION GAP SERPL CALC-SCNC: 15 MMOL/L
APTT BLD: 31.1 SEC
AST SERPL-CCNC: 39 U/L
BASOPHILS # BLD AUTO: 0.07 K/UL
BASOPHILS NFR BLD AUTO: 1 %
BILIRUB SERPL-MCNC: 0.6 MG/DL
BUN SERPL-MCNC: 20 MG/DL
CALCIUM SERPL-MCNC: 9.3 MG/DL
CHLORIDE SERPL-SCNC: 102 MMOL/L
CO2 SERPL-SCNC: 24 MMOL/L
CREAT SERPL-MCNC: 1.02 MG/DL
EOSINOPHIL # BLD AUTO: 0.46 K/UL
EOSINOPHIL NFR BLD AUTO: 6.9 %
GLUCOSE SERPL-MCNC: 83 MG/DL
HCT VFR BLD CALC: 39.2 %
HGB BLD-MCNC: 12.4 G/DL
IMM GRANULOCYTES NFR BLD AUTO: 0.1 %
INR PPP: 1.02 RATIO
LYMPHOCYTES # BLD AUTO: 1.05 K/UL
LYMPHOCYTES NFR BLD AUTO: 15.7 %
MAN DIFF?: NORMAL
MCHC RBC-ENTMCNC: 31.6 GM/DL
MCHC RBC-ENTMCNC: 31.8 PG
MCV RBC AUTO: 100.5 FL
MONOCYTES # BLD AUTO: 0.67 K/UL
MONOCYTES NFR BLD AUTO: 10 %
NEUTROPHILS # BLD AUTO: 4.43 K/UL
NEUTROPHILS NFR BLD AUTO: 66.3 %
PLATELET # BLD AUTO: 273 K/UL
PLATELET # BLD AUTO: 273 K/UL
POTASSIUM SERPL-SCNC: 4.9 MMOL/L
PROT SERPL-MCNC: 6.7 G/DL
PT BLD: 11.6 SEC
RBC # BLD: 3.9 M/UL
RBC # FLD: 13.9 %
SODIUM SERPL-SCNC: 141 MMOL/L
WBC # FLD AUTO: 6.69 K/UL

## 2019-04-01 PROBLEM — I83.90 VARICOSE VEINS: Status: ACTIVE | Noted: 2018-04-02

## 2019-04-01 NOTE — ASSESSMENT
[FreeTextEntry1] : The patient is an 82 y/o female S/P TCAR 4/16/18 for severe stenosis R PICA, asymptomatic. She continues taking Metoprolol XL 25 mg QD for blood pressure. Recovered well from procedure, no evidence of restenosis or neurological symptoms. \par Will work up veins for possible stab phlebectomy vs. sclerotherapy. Continue pain meds and compression.\par

## 2019-04-01 NOTE — PHYSICAL EXAM
[2+] : left 2+ [Ankle Swelling (On Exam)] : not present [Varicose Veins Of Lower Extremities] : not present [] : not present [Alert] : alert [Oriented to Person] : oriented to person [Oriented to Place] : oriented to place [Oriented to Time] : oriented to time [Calm] : calm [de-identified] : WD, WN, NAD. Awake, alert, interactive. Ambulates without difficulty [de-identified] : NAYELI, PERROSAURAL [de-identified] : supple [de-identified] : no cyanosis or deformity. full ROM, MS 5/5\par  [de-identified] : DEYANIRA

## 2019-04-01 NOTE — HISTORY OF PRESENT ILLNESS
[FreeTextEntry1] : The patient is an 82 y/o female referred by Dr Mejia to evaluate for carotid stenosis. She recently had an ECHO, Stress test and CT neck during testing. She states she has increased fatigue and tiredness which she associated with her age. No reports of headaches, lightheadedness or dizziness. No blurred vision, neurological deficits or Amaurosis Fugax. She reports last year having an MI with placement of 3 stents from January through February 2017. She is a non-smoker, and does not exercise regularly.  She takes ASA 81 mg, Atorvastatin 40 mg and Brilinta 90 mg BID as well as Metoprolol. CT scan demonstrates severe stenosis R PICA 70 -75% and moderate stenosis L ICA 50 -60%. Her friend states she has had some issues with memory that seemed to have come on quickly over the past year. [de-identified] : 82 y/o female S/P R TCAR with stent 4/16/18. Pt tolerated procedure well. No reports of neurological deficits, blurred vision, memory issues, HA. She does not report lightheadedness or dizziness.  BP has remained well WNL on Toprol XL 25 mg BID. She taking ASA 81 mg, Brilinta 90 mg BID and Atorvastatin 40 mg QHS. Now complaining of pain on her varicose veins that have worsened. Has been using support stockings for a year. Still veins have worsened and pain not improved despite compression.

## 2019-04-03 ENCOUNTER — OUTPATIENT (OUTPATIENT)
Dept: OUTPATIENT SERVICES | Facility: HOSPITAL | Age: 84
LOS: 1 days | End: 2019-04-03
Payer: MEDICARE

## 2019-04-03 VITALS
TEMPERATURE: 97 F | HEIGHT: 66 IN | DIASTOLIC BLOOD PRESSURE: 79 MMHG | SYSTOLIC BLOOD PRESSURE: 146 MMHG | RESPIRATION RATE: 20 BRPM | HEART RATE: 75 BPM | WEIGHT: 168.21 LBS

## 2019-04-03 DIAGNOSIS — Z98.890 OTHER SPECIFIED POSTPROCEDURAL STATES: Chronic | ICD-10-CM

## 2019-04-03 DIAGNOSIS — Z96.652 PRESENCE OF LEFT ARTIFICIAL KNEE JOINT: Chronic | ICD-10-CM

## 2019-04-03 DIAGNOSIS — I21.3 ST ELEVATION (STEMI) MYOCARDIAL INFARCTION OF UNSPECIFIED SITE: ICD-10-CM

## 2019-04-03 DIAGNOSIS — Z01.818 ENCOUNTER FOR OTHER PREPROCEDURAL EXAMINATION: ICD-10-CM

## 2019-04-03 DIAGNOSIS — I65.29 OCCLUSION AND STENOSIS OF UNSPECIFIED CAROTID ARTERY: ICD-10-CM

## 2019-04-03 DIAGNOSIS — Z95.5 PRESENCE OF CORONARY ANGIOPLASTY IMPLANT AND GRAFT: Chronic | ICD-10-CM

## 2019-04-03 DIAGNOSIS — I83.90 ASYMPTOMATIC VARICOSE VEINS OF UNSPECIFIED LOWER EXTREMITY: ICD-10-CM

## 2019-04-03 DIAGNOSIS — Z29.9 ENCOUNTER FOR PROPHYLACTIC MEASURES, UNSPECIFIED: ICD-10-CM

## 2019-04-03 DIAGNOSIS — Z98.49 CATARACT EXTRACTION STATUS, UNSPECIFIED EYE: Chronic | ICD-10-CM

## 2019-04-03 DIAGNOSIS — Z96.659 PRESENCE OF UNSPECIFIED ARTIFICIAL KNEE JOINT: Chronic | ICD-10-CM

## 2019-04-03 LAB
APTT BLD: 31.5 SEC — SIGNIFICANT CHANGE UP (ref 27.5–36.3)
INR BLD: 1.02 RATIO — SIGNIFICANT CHANGE UP (ref 0.88–1.16)
PROTHROM AB SERPL-ACNC: 11.7 SEC — SIGNIFICANT CHANGE UP (ref 10–12.9)

## 2019-04-03 PROCEDURE — 85730 THROMBOPLASTIN TIME PARTIAL: CPT

## 2019-04-03 PROCEDURE — G0463: CPT

## 2019-04-03 PROCEDURE — 36415 COLL VENOUS BLD VENIPUNCTURE: CPT

## 2019-04-03 PROCEDURE — 85610 PROTHROMBIN TIME: CPT

## 2019-04-03 RX ORDER — SODIUM CHLORIDE 9 MG/ML
3 INJECTION INTRAMUSCULAR; INTRAVENOUS; SUBCUTANEOUS ONCE
Qty: 0 | Refills: 0 | Status: DISCONTINUED | OUTPATIENT
Start: 2019-04-23 | End: 2019-05-08

## 2019-04-03 NOTE — H&P PST ADULT - NSICDXFAMILYHX_GEN_ALL_CORE_FT
FAMILY HISTORY:  Family history of stomach cancer    Sibling  Still living? Unknown  CAD (coronary artery disease), Age at diagnosis: 61-70  Family history of stroke, Age at diagnosis: Age Unknown

## 2019-04-03 NOTE — H&P PST ADULT - HISTORY OF PRESENT ILLNESS
83 y/o female CAD with stent, Right CEA, HTN gerd present to pst with c/o fatigue and swelling to legs due to tortuous varicose veins.. She reports symptoms are generally better in the morning and worsen as the day goes by. She reports that she had LE dopplers that were negative for DVT.  She is now schedule for left lower extremity stab phlebectomies.

## 2019-04-03 NOTE — H&P PST ADULT - ASSESSMENT
83 y/o female CAD with stent, Right CEA, HTN gerd present to pst with c/o fatigue and swelling to legs due to tortuous varicose veins.  She is now schedule for left lower extremity stab phlebectomies.   XAVIERI VTE 2.0 SCORE [CLOT updated 2019]    AGE RELATED RISK FACTORS                                                       MOBILITY RELATED FACTORS  [ ] Age 41-60 years                                            (1 Point)                    [ ] Bed rest                                                        (1 Point)  [ ] Age: 61-74 years                                           (2 Points)                  [ ] Plaster cast                                                   (2 Points)  [x ] Age= 75 years                                              (3 Points)                    [ ] Bed bound for more than 72 hours                 (2 Points)    DISEASE RELATED RISK FACTORS                                               GENDER SPECIFIC FACTORS  [ ] Edema in the lower extremities                       (1 Point)              [ ] Pregnancy                                                     (1 Point)  [x ] Varicose veins                                               (1 Point)                     [ ] Post-partum < 6 weeks                                   (1 Point)             [ x] BMI > 25 Kg/m2                                            (1 Point)                     [ ] Hormonal therapy  or oral contraception          (1 Point)                 [ ] Sepsis (in the previous month)                        (1 Point)               [ ] History of pregnancy complications                 (1 point)  [ ] Pneumonia or serious lung disease                                               [ ] Unexplained or recurrent                     (1 Point)           (in the previous month)                               (1 Point)  [ ] Abnormal pulmonary function test                     (1 Point)                 SURGERY RELATED RISK FACTORS  [ ] Acute myocardial infarction                              (1 Point)               [ ]  Section                                             (1 Point)  [ ] Congestive heart failure (in the previous month)  (1 Point)      [ ] Minor surgery                                                  (1 Point)   [ ] Inflammatory bowel disease                             (1 Point)               [ ] Arthroscopic surgery                                        (2 Points)  [ ] Central venous access                                      (2 Points)                [ x] General surgery lasting more than 45 minutes (2 points)  [ ] Malignancy- Present or previous                   (2 Points)                [ ] Elective arthroplasty                                         (5 points)    [ ] Stroke (in the previous month)                          (5 Points)                                                                                                                                                           HEMATOLOGY RELATED FACTORS                                                 TRAUMA RELATED RISK FACTORS  [ ] Prior episodes of VTE                                     (3 Points)                [ ] Fracture of the hip, pelvis, or leg                       (5 Points)  [ ] Positive family history for VTE                         (3 Points)             [ ] Acute spinal cord injury (in the previous month)  (5 Points)  [ ] Prothrombin 13680 A                                     (3 Points)               [ ] Paralysis  (less than 1 month)                             (5 Points)  [ ] Factor V Leiden                                             (3 Points)                  [ ] Multiple Trauma within 1 month                        (5 Points)  [ ] Lupus anticoagulants                                     (3 Points)                                                           [ ] Anticardiolipin antibodies                               (3 Points)                                                       [ ] High homocysteine in the blood                      (3 Points)                                             [ ] Other congenital or acquired thrombophilia      (3 Points)                                                [ ] Heparin induced thrombocytopenia                  (3 Points)                                     Total Score [ 7   ]

## 2019-04-03 NOTE — H&P PST ADULT - NSICDXPASTMEDICALHX_GEN_ALL_CORE_FT
PAST MEDICAL HISTORY:  CAD (coronary artery disease)     Carotid stenosis     Compression fracture of thoracic vertebra     GERD (gastroesophageal reflux disease)     [place] (hard of hearing)     MI (myocardial infarction) 3 stent placed  jan     Osteoporosis

## 2019-04-03 NOTE — ASU PATIENT PROFILE, ADULT - LEARNING ASSESSMENT (PATIENT) ADDITIONAL COMMENTS
Instructed pt on pre-op instructions, tips for safer surgery, pain management scale, pre-surgical infection prevention, Take metoprolol with a sip of water the morning of surgery, medical & cardiac clearances - pending, call cardiologist for instructions on Brillinta & ASA, verbalized understanding of all instructions given.

## 2019-04-03 NOTE — H&P PST ADULT - NSICDXPROBLEM_GEN_ALL_CORE_FT
PROBLEM DIAGNOSES  Problem: MI (myocardial infarction)  Assessment and Plan: cardiac clearance   hold ASA/ Brilinta at the cardiologist discretion    Problem: Carotid stenosis  Assessment and Plan: medical clearance appreciated    continue medication as directed   labs from PCP reviewed    Problem: Need for prophylactic measure  Assessment and Plan: high risk, the surgical team will evaluate for appropriate VTE prophylaxis    Problem: Varicose veins  Assessment and Plan: left lower extremity stab phlebectomies.

## 2019-04-03 NOTE — H&P PST ADULT - NSICDXPASTSURGICALHX_GEN_ALL_CORE_FT
PAST SURGICAL HISTORY:  H/O carotid endarterectomy right 2018    H/O total knee replacement, left     S/P cataract extraction and insertion of intraocular lens     S/P knee replacement left    Stented coronary artery jan 260 2017   dr bland  Feb 2017 2 stents

## 2019-04-03 NOTE — H&P PST ADULT - ACTIVITY
active , walks the dog couple times a day
[FreeTextEntry1] : CBC done 19:\par WBC: 6.8\par A.65\par ANC: 4.47\par Hgb: 14.0\par Hct: 41.7\par Plt: 141.0

## 2019-04-04 PROBLEM — I21.3 ST ELEVATION (STEMI) MYOCARDIAL INFARCTION OF UNSPECIFIED SITE: Chronic | Status: ACTIVE | Noted: 2017-02-17

## 2019-04-22 NOTE — ASU PATIENT PROFILE, ADULT - LEARNING ASSESSMENT (PATIENT) ADDITIONAL COMMENTS
Telephone update 4/22/19, Instructed pt on pre-op instructions, tips for safer surgery, pain management scale, pre-surgical infection prevention, Take metoprolol with a sip of water the morning of surgery, medical & cardiac clearances - on chart, Brillinta & ASA stopped sat. 4/20/19 as per Dr Mahmood, verbalized understanding of all instructions given.

## 2019-04-22 NOTE — ASU PATIENT PROFILE, ADULT - PSH
H/O carotid endarterectomy  right 2018  H/O total knee replacement, left    S/P cataract extraction and insertion of intraocular lens    S/P knee replacement  left  Stented coronary artery  jan 260 2017   dr bland  Feb 2017 2 stents

## 2019-04-22 NOTE — ASU PATIENT PROFILE, ADULT - PMH
CAD (coronary artery disease)    Carotid stenosis    Compression fracture of thoracic vertebra    GERD (gastroesophageal reflux disease)    [place] (hard of hearing)    MI (myocardial infarction)  3 stent placed  jan   Osteoporosis

## 2019-04-23 ENCOUNTER — OUTPATIENT (OUTPATIENT)
Dept: INPATIENT UNIT | Facility: HOSPITAL | Age: 84
LOS: 1 days | End: 2019-04-23
Payer: MEDICARE

## 2019-04-23 ENCOUNTER — RESULT REVIEW (OUTPATIENT)
Age: 84
End: 2019-04-23

## 2019-04-23 VITALS
RESPIRATION RATE: 22 BRPM | OXYGEN SATURATION: 97 % | DIASTOLIC BLOOD PRESSURE: 56 MMHG | SYSTOLIC BLOOD PRESSURE: 121 MMHG | HEART RATE: 78 BPM

## 2019-04-23 VITALS
TEMPERATURE: 98 F | HEIGHT: 66 IN | RESPIRATION RATE: 16 BRPM | DIASTOLIC BLOOD PRESSURE: 73 MMHG | HEART RATE: 67 BPM | SYSTOLIC BLOOD PRESSURE: 158 MMHG | WEIGHT: 162.26 LBS | OXYGEN SATURATION: 100 %

## 2019-04-23 DIAGNOSIS — Z96.652 PRESENCE OF LEFT ARTIFICIAL KNEE JOINT: Chronic | ICD-10-CM

## 2019-04-23 DIAGNOSIS — Z98.49 CATARACT EXTRACTION STATUS, UNSPECIFIED EYE: Chronic | ICD-10-CM

## 2019-04-23 DIAGNOSIS — Z98.890 OTHER SPECIFIED POSTPROCEDURAL STATES: Chronic | ICD-10-CM

## 2019-04-23 DIAGNOSIS — Z96.659 PRESENCE OF UNSPECIFIED ARTIFICIAL KNEE JOINT: Chronic | ICD-10-CM

## 2019-04-23 DIAGNOSIS — Z95.5 PRESENCE OF CORONARY ANGIOPLASTY IMPLANT AND GRAFT: Chronic | ICD-10-CM

## 2019-04-23 DIAGNOSIS — I83.90 ASYMPTOMATIC VARICOSE VEINS OF UNSPECIFIED LOWER EXTREMITY: ICD-10-CM

## 2019-04-23 PROBLEM — H91.90 UNSPECIFIED HEARING LOSS, UNSPECIFIED EAR: Chronic | Status: ACTIVE | Noted: 2019-04-03

## 2019-04-23 LAB
ANION GAP SERPL CALC-SCNC: 11 MMOL/L — SIGNIFICANT CHANGE UP (ref 5–17)
BUN SERPL-MCNC: 23 MG/DL — HIGH (ref 8–20)
CALCIUM SERPL-MCNC: 9.3 MG/DL — SIGNIFICANT CHANGE UP (ref 8.6–10.2)
CHLORIDE SERPL-SCNC: 105 MMOL/L — SIGNIFICANT CHANGE UP (ref 98–107)
CO2 SERPL-SCNC: 23 MMOL/L — SIGNIFICANT CHANGE UP (ref 22–29)
CREAT SERPL-MCNC: 0.81 MG/DL — SIGNIFICANT CHANGE UP (ref 0.5–1.3)
GLUCOSE SERPL-MCNC: 91 MG/DL — SIGNIFICANT CHANGE UP (ref 70–115)
HCT VFR BLD CALC: 39.4 % — SIGNIFICANT CHANGE UP (ref 37–47)
HGB BLD-MCNC: 12.8 G/DL — SIGNIFICANT CHANGE UP (ref 12–16)
MCHC RBC-ENTMCNC: 31 PG — SIGNIFICANT CHANGE UP (ref 27–31)
MCHC RBC-ENTMCNC: 32.5 G/DL — SIGNIFICANT CHANGE UP (ref 32–36)
MCV RBC AUTO: 95.4 FL — SIGNIFICANT CHANGE UP (ref 81–99)
PLATELET # BLD AUTO: 227 K/UL — SIGNIFICANT CHANGE UP (ref 150–400)
POTASSIUM SERPL-MCNC: 5 MMOL/L — SIGNIFICANT CHANGE UP (ref 3.5–5.3)
POTASSIUM SERPL-SCNC: 5 MMOL/L — SIGNIFICANT CHANGE UP (ref 3.5–5.3)
RBC # BLD: 4.13 M/UL — LOW (ref 4.4–5.2)
RBC # FLD: 13.7 % — SIGNIFICANT CHANGE UP (ref 11–15.6)
SODIUM SERPL-SCNC: 139 MMOL/L — SIGNIFICANT CHANGE UP (ref 135–145)
WBC # BLD: 5.4 K/UL — SIGNIFICANT CHANGE UP (ref 4.8–10.8)
WBC # FLD AUTO: 5.4 K/UL — SIGNIFICANT CHANGE UP (ref 4.8–10.8)

## 2019-04-23 PROCEDURE — 88304 TISSUE EXAM BY PATHOLOGIST: CPT | Mod: 26

## 2019-04-23 PROCEDURE — 80048 BASIC METABOLIC PNL TOTAL CA: CPT

## 2019-04-23 PROCEDURE — 36415 COLL VENOUS BLD VENIPUNCTURE: CPT

## 2019-04-23 PROCEDURE — 88304 TISSUE EXAM BY PATHOLOGIST: CPT

## 2019-04-23 PROCEDURE — 37765 STAB PHLEB VEINS XTR 10-20: CPT | Mod: 79,LT

## 2019-04-23 PROCEDURE — 37765 STAB PHLEB VEINS XTR 10-20: CPT | Mod: LT

## 2019-04-23 PROCEDURE — 85027 COMPLETE CBC AUTOMATED: CPT

## 2019-04-23 RX ORDER — ONDANSETRON 8 MG/1
4 TABLET, FILM COATED ORAL ONCE
Qty: 0 | Refills: 0 | Status: COMPLETED | OUTPATIENT
Start: 2019-04-23 | End: 2019-04-23

## 2019-04-23 RX ORDER — SODIUM CHLORIDE 9 MG/ML
1000 INJECTION, SOLUTION INTRAVENOUS
Qty: 0 | Refills: 0 | Status: DISCONTINUED | OUTPATIENT
Start: 2019-04-23 | End: 2019-04-23

## 2019-04-23 RX ORDER — METOPROLOL TARTRATE 50 MG
1 TABLET ORAL
Qty: 0 | Refills: 0 | COMMUNITY

## 2019-04-23 RX ORDER — FENTANYL CITRATE 50 UG/ML
25 INJECTION INTRAVENOUS
Qty: 0 | Refills: 0 | Status: DISCONTINUED | OUTPATIENT
Start: 2019-04-23 | End: 2019-04-23

## 2019-04-23 RX ADMIN — ONDANSETRON 4 MILLIGRAM(S): 8 TABLET, FILM COATED ORAL at 12:15

## 2019-04-23 RX ADMIN — FENTANYL CITRATE 25 MICROGRAM(S): 50 INJECTION INTRAVENOUS at 12:15

## 2019-04-23 RX ADMIN — FENTANYL CITRATE 25 MICROGRAM(S): 50 INJECTION INTRAVENOUS at 12:30

## 2019-04-23 NOTE — BRIEF OPERATIVE NOTE - NSICDXBRIEFPROCEDURE_GEN_ALL_CORE_FT
PROCEDURES:  Stab phlebectomy, varicose vein, 10 to 20 stab incisions 23-Apr-2019 11:53:08  Андрей Monique

## 2019-04-23 NOTE — ASU DISCHARGE PLAN (ADULT/PEDIATRIC) - CARE PROVIDER_API CALL
Bartolome Lyons)  Surgery; Vascular Surgery  [address], [address]st [place] [address]  Phone: [phone]  Fax: [phone]  Follow Up Time:

## 2019-04-23 NOTE — ASU DISCHARGE PLAN (ADULT/PEDIATRIC) - ASU DC SPECIAL INSTRUCTIONSFT
Please call to make follow-up appointment with Dr. Patiño in 2 weeks  Can take dressing off after 48hours  Try to elevate leg as much as possible in first 48hours

## 2019-04-23 NOTE — ASU DISCHARGE PLAN (ADULT/PEDIATRIC) - CALL YOUR DOCTOR IF YOU HAVE ANY OF THE FOLLOWING:
Bleeding that does not stop/Swelling that gets worse/Numbness, tingling, color or temperature change to extremity

## 2019-04-26 LAB — SURGICAL PATHOLOGY STUDY: SIGNIFICANT CHANGE UP

## 2019-04-29 ENCOUNTER — MESSAGE (OUTPATIENT)
Age: 84
End: 2019-04-29

## 2019-05-03 ENCOUNTER — APPOINTMENT (OUTPATIENT)
Age: 84
End: 2019-05-03
Payer: COMMERCIAL

## 2019-05-03 VITALS
SYSTOLIC BLOOD PRESSURE: 133 MMHG | HEART RATE: 73 BPM | DIASTOLIC BLOOD PRESSURE: 86 MMHG | BODY MASS INDEX: 27.49 KG/M2 | OXYGEN SATURATION: 100 % | WEIGHT: 171.03 LBS | HEIGHT: 66 IN | RESPIRATION RATE: 16 BRPM | TEMPERATURE: 97.7 F

## 2019-05-03 PROCEDURE — 99024 POSTOP FOLLOW-UP VISIT: CPT

## 2019-05-03 NOTE — REVIEW OF SYSTEMS
[Limb Pain] : limb pain [Limb Swelling] : limb swelling [Skin Wound] : no skin wound [As Noted in HPI] : as noted in HPI [Negative] : Respiratory [FreeTextEntry9] : resolving ecchymosis

## 2019-05-03 NOTE — PHYSICAL EXAM
[2+] : right 2+ [Ankle Swelling Bilaterally] : severe [Ankle Swelling (On Exam)] : not present [Varicose Veins Of Lower Extremities] : bilaterally [Oriented to Person] : oriented to person [] : not present [Alert] : alert [Oriented to Time] : oriented to time [Oriented to Place] : oriented to place [Calm] : calm [de-identified] : WD, WN, NAD. Awake, alert, interactive. Ambulates without difficulty [de-identified] : supple [de-identified] : WWP. There is ecchymosis and superficial hematomas in varying states of resolution to thigh LLE medial aspect. Tender to touch. Skin cool, no erythema. Stab sites healed, no drainage. No s/s of infection. [de-identified] : NAYELI, PERROSAURAL [de-identified] : no cyanosis or deformity. full ROM, MS 5/5\par

## 2019-05-03 NOTE — HISTORY OF PRESENT ILLNESS
[FreeTextEntry1] : 83 y/o female s/p LLE GSV RFA and stab phlebectomies 4/10/19.  She states she has had bruising and swelling from medial aspect of LLE from thigh to calf. She reports that stab sites are tender. She wore compression for 2 days and then removed them. She is not walking as much as she should and rests her legs, but does not elevate them above the level of the heart. She does not have compression stockings. She taking ASA 81 mg, Brilinta 90 mg BID and Atorvastatin 40 mg QHS.

## 2019-05-03 NOTE — ASSESSMENT
[FreeTextEntry1] : 85 y/o female s/p LLE GSV RFA and stab phlebectomies 4/10/19. We discussed that her symptoms are typically seen with healing from GSV and stab phlebectomies. Symptoms should never worsen and continue to resolve. I prescribed thigh high compression stockings and wrapped her legs in Ace wraps from thigh to foot.  Discussed that she could apply cool compresses and take NSAIDs for pain PRN. She should ambulate as frequently as possible and elevate legs at rest. RTC in 3 weeks, sooner if symptoms worsen.\par

## 2019-05-07 ENCOUNTER — APPOINTMENT (OUTPATIENT)
Dept: VASCULAR SURGERY | Facility: CLINIC | Age: 84
End: 2019-05-07
Payer: MEDICARE

## 2019-05-07 VITALS
TEMPERATURE: 97.6 F | HEART RATE: 75 BPM | OXYGEN SATURATION: 97 % | HEIGHT: 66 IN | WEIGHT: 173 LBS | SYSTOLIC BLOOD PRESSURE: 154 MMHG | DIASTOLIC BLOOD PRESSURE: 76 MMHG | BODY MASS INDEX: 27.8 KG/M2

## 2019-05-07 PROCEDURE — 93971 EXTREMITY STUDY: CPT

## 2019-05-07 PROCEDURE — 99024 POSTOP FOLLOW-UP VISIT: CPT

## 2019-05-07 NOTE — HISTORY OF PRESENT ILLNESS
[FreeTextEntry1] : 85 y/o female s/p LLE GSV RFA and stab phlebectomies 4/10/19.  She states leaving Ace wraps on at night caused more pain but improved swelling. There is more swelling in her foot now. She has purchased thigh high compression garments and is waiting for them to come in. She continues to demonstrate bruising, hematomas to stab sites and tenderness, improved from last week. She has been walking more and elevating legs.  She does not have compression stockings. She taking ASA 81 mg, Brilinta 90 mg BID and Atorvastatin 40 mg QHS.

## 2019-05-07 NOTE — PHYSICAL EXAM
[2+] : left 2+ [Ankle Swelling Bilaterally] : severe [Varicose Veins Of Lower Extremities] : bilaterally [Oriented to Place] : oriented to place [Alert] : alert [Oriented to Person] : oriented to person [Oriented to Time] : oriented to time [Calm] : calm [Ankle Swelling (On Exam)] : not present [] : not present [de-identified] : WD, WN, NAD. Awake, alert, interactive. Ambulates without difficulty [de-identified] : NAYELI, PERROSAURAL [de-identified] : WWP. There is ecchymosis and superficial hematomas in varying states of resolution to thigh LLE medial aspect. Tender to touch. Skin cool, no erythema. Stab sites healed, no drainage. No s/s of infection. [de-identified] : supple [de-identified] : no cyanosis or deformity. full ROM, MS 5/5\par

## 2019-05-07 NOTE — REVIEW OF SYSTEMS
[Limb Pain] : limb pain [Limb Swelling] : limb swelling [As Noted in HPI] : as noted in HPI [Negative] : Respiratory [Skin Wound] : no skin wound [FreeTextEntry9] : resolving ecchymosis

## 2019-05-07 NOTE — ASSESSMENT
[FreeTextEntry1] : 85 y/o female s/p LLE GSV RFA and stab phlebectomies 4/10/19. We discussed symptoms will continue to improve over 4 weeks. She will continue to wear compression, ambulate frequently and elevate legs at rest.   She may apply cool compresses and take NSAIDs for pain PRN. RTC in 2 weeks.\par

## 2019-05-07 NOTE — PROCEDURE
[FreeTextEntry1] : U/S Carotid Duplex 2/26/19 demonstrates R- patent carotid artery stent without evidence of InStent stenosis. Peak systolic velocities are consistent with the absence of hemodynamically significant stenosis.  Left - there is fibrocalcific plaque with acoustic shadowing in the carotid bulb and P ICA. Peak systolic velocities are consistent with the absence of hemodynamically significant stenosis. However, cannot r/o presence of greater degree of stenosis due to limited visualization. Vertebral flow cephalad and flow velocities WNL b/l.\par \par U/S Venous Duplex 3/12/19 demonstrates BLE tortuous tributaries Right - 12.1 mm and Left - 6.1 mm.\par \par U/S Venous Duplex 5/7/19 negative for DVT, SVT or VI. There was hematoma at stab sites.

## 2019-05-22 ENCOUNTER — APPOINTMENT (OUTPATIENT)
Dept: VASCULAR SURGERY | Facility: CLINIC | Age: 84
End: 2019-05-22
Payer: MEDICARE

## 2019-05-22 VITALS
SYSTOLIC BLOOD PRESSURE: 133 MMHG | HEART RATE: 73 BPM | TEMPERATURE: 97.7 F | RESPIRATION RATE: 16 BRPM | WEIGHT: 166.03 LBS | BODY MASS INDEX: 26.68 KG/M2 | OXYGEN SATURATION: 98 % | DIASTOLIC BLOOD PRESSURE: 79 MMHG | HEIGHT: 66 IN

## 2019-05-22 DIAGNOSIS — Z98.890 OTHER SPECIFIED POSTPROCEDURAL STATES: ICD-10-CM

## 2019-05-22 DIAGNOSIS — I65.23 OCCLUSION AND STENOSIS OF BILATERAL CAROTID ARTERIES: ICD-10-CM

## 2019-05-22 PROCEDURE — 99024 POSTOP FOLLOW-UP VISIT: CPT

## 2019-05-22 NOTE — PHYSICAL EXAM
[2+] : left 2+ [Varicose Veins Of Lower Extremities] : bilaterally [Ankle Swelling Bilaterally] : severe [Alert] : alert [Oriented to Person] : oriented to person [Oriented to Place] : oriented to place [Oriented to Time] : oriented to time [Calm] : calm [Ankle Swelling (On Exam)] : not present [] : not present [de-identified] : WD, WN, NAD. Awake, alert, interactive. Ambulates without difficulty [de-identified] : NAYELI, PERROSAURAL [de-identified] : supple [FreeTextEntry1] : Cap refill < 2 sec. Some localized areas of resolving edema along GSV. No pain or tenderness. No ecchymosis.  [de-identified] : no cyanosis or deformity. full ROM, MS 5/5\par  [de-identified] : DEYANIRA.

## 2019-05-22 NOTE — ASSESSMENT
[FreeTextEntry1] : 83 y/o female s/p LLE GSV RFA and stab phlebectomies 4/10/19. She will continue to wear compression, ambulate frequently and elevate legs at rest. RTC in 3 months after she returns from [place]..

## 2019-05-22 NOTE — REVIEW OF SYSTEMS
[Limb Pain] : limb pain [Limb Swelling] : limb swelling [As Noted in HPI] : as noted in HPI [Negative] : Psychiatric [Skin Wound] : no skin wound [FreeTextEntry9] : resolving ecchymosis

## 2019-05-22 NOTE — HISTORY OF PRESENT ILLNESS
[FreeTextEntry1] : 85 y/o female s/p LLE GSV RFA and stab phlebectomies 4/10/19. She is feeling well. Pain to LLE has resolved. The appearance of varicose veins and edema has improved, but she still has some resolving edema. No pain with walking or at rest. No fever or chills. She continues to wear thigh high compression stockings, ambulate frequently and elevate legs at rest. She will be going to [facility] for the summer. She taking ASA 81 mg, Brilinta 90 mg BID and Atorvastatin 40 mg QHS. No fever or chills.\par \par

## 2019-10-14 NOTE — ASU PATIENT PROFILE, ADULT - TEACHING/LEARNING LEARNING PREFERENCES
Consult dictated. Suspect that he most likely had peripheral vertigo combined with ongoing diarrhea for several days that may have caused the orthostatic symptoms. His stroke work-up was negative. Okay to discharge home.   Estelle Pierre MD written material/verbal instruction

## 2020-11-24 ENCOUNTER — TRANSCRIPTION ENCOUNTER (OUTPATIENT)
Age: 85
End: 2020-11-24

## 2020-12-07 ENCOUNTER — TRANSCRIPTION ENCOUNTER (OUTPATIENT)
Age: 85
End: 2020-12-07

## 2021-06-17 NOTE — H&P PST ADULT - LAST ECHOCARDIOGRAM
[FreeTextEntry1] : Pt recovering well at home s/p OHS. Reviewed all medications and dosages with pt understanding. Pt has all medications in home and is taking as prescribed. Pain controlled with current medication regimen. Pt -104 regular, pt asymptomatic.  No new symptoms, issues or concerns to report at this time.\par 
March 2018

## 2021-07-21 NOTE — ASU PATIENT PROFILE, ADULT - NS SC CAGE ALCOHOL ANNOYED YOU
Notified pt, will make an apt   
Order is good until Feb of next year  
Pt called stating that she never got around to do her dexa scan due to her insurance.    Referral on file already  and would like to set up an apt but needs an updated referral.    Please advise   
no

## 2022-11-15 ENCOUNTER — LABORATORY RESULT (OUTPATIENT)
Age: 87
End: 2022-11-15

## 2022-11-15 ENCOUNTER — APPOINTMENT (OUTPATIENT)
Dept: FAMILY MEDICINE | Facility: CLINIC | Age: 87
End: 2022-11-15

## 2022-11-15 VITALS
RESPIRATION RATE: 14 BRPM | BODY MASS INDEX: 26.36 KG/M2 | SYSTOLIC BLOOD PRESSURE: 130 MMHG | HEART RATE: 76 BPM | HEIGHT: 66 IN | OXYGEN SATURATION: 98 % | WEIGHT: 164 LBS | DIASTOLIC BLOOD PRESSURE: 58 MMHG

## 2022-11-15 DIAGNOSIS — Z12.11 ENCOUNTER FOR SCREENING FOR MALIGNANT NEOPLASM OF COLON: ICD-10-CM

## 2022-11-15 DIAGNOSIS — Z78.9 OTHER SPECIFIED HEALTH STATUS: ICD-10-CM

## 2022-11-15 DIAGNOSIS — E66.3 OVERWEIGHT: ICD-10-CM

## 2022-11-15 DIAGNOSIS — E55.9 VITAMIN D DEFICIENCY, UNSPECIFIED: ICD-10-CM

## 2022-11-15 DIAGNOSIS — R53.1 WEAKNESS: ICD-10-CM

## 2022-11-15 DIAGNOSIS — R26.2 DIFFICULTY IN WALKING, NOT ELSEWHERE CLASSIFIED: ICD-10-CM

## 2022-11-15 DIAGNOSIS — R53.83 OTHER FATIGUE: ICD-10-CM

## 2022-11-15 DIAGNOSIS — Z00.00 ENCOUNTER FOR GENERAL ADULT MEDICAL EXAMINATION W/OUT ABNORMAL FINDINGS: ICD-10-CM

## 2022-11-15 DIAGNOSIS — Z11.52 ENCOUNTER FOR SCREENING FOR COVID-19: ICD-10-CM

## 2022-11-15 DIAGNOSIS — Z23 ENCOUNTER FOR IMMUNIZATION: ICD-10-CM

## 2022-11-15 DIAGNOSIS — M81.0 AGE-RELATED OSTEOPOROSIS W/OUT CURRENT PATHOLOGICAL FRACTURE: ICD-10-CM

## 2022-11-15 PROCEDURE — 90662 IIV NO PRSV INCREASED AG IM: CPT

## 2022-11-15 PROCEDURE — G0439: CPT

## 2022-11-15 PROCEDURE — 36415 COLL VENOUS BLD VENIPUNCTURE: CPT

## 2022-11-15 PROCEDURE — G0008: CPT

## 2022-11-15 RX ORDER — TICAGRELOR 90 MG/1
90 TABLET ORAL
Refills: 0 | Status: COMPLETED | COMMUNITY
End: 2022-11-15

## 2022-11-15 RX ORDER — METOPROLOL SUCCINATE 25 MG/1
25 TABLET, EXTENDED RELEASE ORAL
Qty: 180 | Refills: 3 | Status: ACTIVE | COMMUNITY
Start: 2022-11-15

## 2022-11-17 LAB
25(OH)D3 SERPL-MCNC: 12.9 NG/ML
ALBUMIN SERPL ELPH-MCNC: 4 G/DL
ALP BLD-CCNC: 71 U/L
ALT SERPL-CCNC: 18 U/L
ANION GAP SERPL CALC-SCNC: 12 MMOL/L
APPEARANCE: ABNORMAL
AST SERPL-CCNC: 24 U/L
BASOPHILS # BLD AUTO: 0.06 K/UL
BASOPHILS NFR BLD AUTO: 1.1 %
BILIRUB SERPL-MCNC: 0.7 MG/DL
BILIRUBIN URINE: NEGATIVE
BLOOD URINE: NEGATIVE
BUN SERPL-MCNC: 21 MG/DL
C PEPTIDE SERPL-MCNC: 6.7 NG/ML
CALCIUM SERPL-MCNC: 9.8 MG/DL
CHLORIDE SERPL-SCNC: 103 MMOL/L
CHOLEST SERPL-MCNC: 146 MG/DL
CO2 SERPL-SCNC: 24 MMOL/L
COLOR: YELLOW
CREAT SERPL-MCNC: 1.13 MG/DL
CREAT SPEC-SCNC: 184 MG/DL
EGFR: 47 ML/MIN/1.73M2
EOSINOPHIL # BLD AUTO: 0.19 K/UL
EOSINOPHIL NFR BLD AUTO: 3.5 %
ESTIMATED AVERAGE GLUCOSE: 108 MG/DL
FERRITIN SERPL-MCNC: 63 NG/ML
FOLATE SERPL-MCNC: 5.5 NG/ML
GLUCOSE QUALITATIVE U: NEGATIVE
GLUCOSE SERPL-MCNC: 96 MG/DL
HBA1C MFR BLD HPLC: 5.4 %
HCT VFR BLD CALC: 40.8 %
HDLC SERPL-MCNC: 66 MG/DL
HGB BLD-MCNC: 13 G/DL
IMM GRANULOCYTES NFR BLD AUTO: 0.2 %
IRON SATN MFR SERPL: 34 %
IRON SERPL-MCNC: 106 UG/DL
KETONES URINE: NEGATIVE
LDLC SERPL CALC-MCNC: 66 MG/DL
LEUKOCYTE ESTERASE URINE: NEGATIVE
LYMPHOCYTES # BLD AUTO: 1.32 K/UL
LYMPHOCYTES NFR BLD AUTO: 24.4 %
MAN DIFF?: NORMAL
MCHC RBC-ENTMCNC: 31.9 GM/DL
MCHC RBC-ENTMCNC: 31.9 PG
MCV RBC AUTO: 100.2 FL
MICROALBUMIN 24H UR DL<=1MG/L-MCNC: 1.6 MG/DL
MICROALBUMIN/CREAT 24H UR-RTO: 9 MG/G
MONOCYTES # BLD AUTO: 0.49 K/UL
MONOCYTES NFR BLD AUTO: 9.1 %
NEUTROPHILS # BLD AUTO: 3.34 K/UL
NEUTROPHILS NFR BLD AUTO: 61.7 %
NITRITE URINE: NEGATIVE
NONHDLC SERPL-MCNC: 80 MG/DL
PH URINE: 5.5
PLATELET # BLD AUTO: 248 K/UL
POTASSIUM SERPL-SCNC: 4.4 MMOL/L
PROT SERPL-MCNC: 6.7 G/DL
PROTEIN URINE: NORMAL
RBC # BLD: 4.07 M/UL
RBC # FLD: 14.4 %
SODIUM SERPL-SCNC: 139 MMOL/L
SPECIFIC GRAVITY URINE: 1.03
T4 SERPL-MCNC: 7.9 UG/DL
TIBC SERPL-MCNC: 316 UG/DL
TRIGL SERPL-MCNC: 67 MG/DL
TSH SERPL-ACNC: 3.04 UIU/ML
UIBC SERPL-MCNC: 210 UG/DL
UROBILINOGEN URINE: NORMAL
VIT B12 SERPL-MCNC: 241 PG/ML
WBC # FLD AUTO: 5.41 K/UL

## 2022-11-21 LAB — URINE CULTURE <10: ABNORMAL

## 2022-12-07 LAB — HEMOCCULT STL QL IA: POSITIVE

## 2022-12-09 DIAGNOSIS — R19.5 OTHER FECAL ABNORMALITIES: ICD-10-CM

## 2023-01-17 NOTE — H&P ADULT. - NS NEC GEN PE MLT EXAM PC
Airway  Performed by: Lori Leigh MD  Authorized by: Lori Leigh MD     Final Airway Type:  Endotracheal airway  Final Endotracheal Airway*:  ETT  ETT Size (mm)*:  7.5  Cuff*:  Regular  Technique Used for Successful ETT Placement:  Video laryngoscopy  Devices/Methods Used in Placement*:  Mask, Stylet and Oral ETT  Intubation Procedure*:  ETCO2, Preoxygenation, Atraumatic, Dentition Unchanged and Pharynx Clear  Insertion Site:  Oral  Blade Type*:  Video Laryngoscope  Blade Size*:  3  Measured from*:  Lips  Secured at (cm)*:  23  Placement Verified by: auscultation, capnometry and palpation of cuff    Glottic View*:  1 - full view of glottis  Attempts*:  1  Location:  OR  Urgency:  Elective  Difficult Airway: No    Indications for Airway Management:  Anesthesia  Sedation Level:  Deep  Mask Difficulty Assessment:  1 - vent by mask    
No bruits; no thyromegaly or nodules

## 2023-12-27 NOTE — PROVIDER CONTACT NOTE (CRITICAL VALUE NOTIFICATION) - PERSON GIVING RESULT:
LVM to remind of 1/3/2024 and No ASA or BT 7 days prior and no NSAIDS 7 days before. Arrival 1410  
[facility]

## 2024-05-31 NOTE — ED ADULT NURSE NOTE - CCCP TRG CHIEF CMPLNT
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 Tolerating.  Did not tolerate 10 mg daily did not tolerate pravastatin did not tolerate Zetia did not tolerate atorvastatin  Reports low cholesterol diet. Only eating fish. Getting some dunlap and egg with tuna salad for lunch.    Sleep has changed scheduled. She is going to bed early after yoga.    Has noticed some slowing with recall. Reports always being poor with names, but feels like recall has slow. 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    Personally Reviewed Patient's Medical, surgical, family and social hx. Changes updated in [employer].  Care Team updated in Epic    Review of Systems:  Review of Systems   Respiratory:  Negative for cough and shortness of breath.        Health Maintenance:   Reviewed with patient  Due for the following:      PHYSICAL EXAM     /62 (BP Location: Right arm, Patient Position: Sitting)   Ht 5' 3" (1.6 m)   Wt 51.6 kg (113 lb 12.1 oz)   LMP  (LMP Unknown)   BMI 20.15 kg/m²     Gen: Well Appearing, NAD  HEENT: PERR, EOMI  Neck: FROM, no thyromegaly, no cervical adenopathy  CVD: RRR, no M/R/G  Pulm: Normal work of breathing, CTAB, no wheezing  Abd:  Soft, NT, ND non TTP, no mass  MSK: no LE edema  Neuro: A&Ox3, gait normal, speech normal  Mood; Mood normal, behavior normal, thought process linear       LABS     Labs reviewed; Notable for  Lab Results   Component Value Date    CHOL 289 (H) 05/30/2024    CHOL 263 (H) 03/27/2024    CHOL 255 (H) 05/29/2023     Lab Results   Component Value Date    HDL 89 (H) 05/30/2024    HDL 70 03/27/2024    HDL 88 (H) 05/29/2023     Lab Results   Component Value Date    LDLCALC 191.0 (H) 05/30/2024    LDLCALC 183.4 (H) 03/27/2024    LDLCALC 156.2 05/29/2023     Lab Results   Component Value Date    TRIG 45 " 05/30/2024    TRIG 48 03/27/2024    TRIG 54 05/29/2023       Lab Results   Component Value Date    CHOLHDL 30.8 05/30/2024    CHOLHDL 26.6 03/27/2024    CHOLHDL 34.5 05/29/2023           ASSESSMENT     1. Annual physical exam  CBC Auto Differential    Comprehensive Metabolic Panel    Hemoglobin A1C    Lipid Panel    TSH      2. Familial hyperlipidemia, high LDL  rosuvastatin (CRESTOR) 5 MG tablet    Lipid Panel      3. 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 Annual physical exam  Updated problem list, medical history, care team and discussed HM.     - CBC Auto Differential; Future  - Comprehensive Metabolic Panel; Future  - Hemoglobin A1C; Future  - Lipid Panel; Future  - TSH; Future    2. Familial hyperlipidemia, high LDL  Will try Crestor 5 mg daily repeat lipid 90 days.  If not at goal will try empiric acid to see if she tolerates better  - rosuvastatin (CRESTOR) 5 MG tablet; Take 1 tablet (5 mg total) by mouth once daily.  Dispense: 90 tablet; Refill: 3  - Lipid Panel; Future    3. Subclinical hypothyroidism  Will recheck TSH continue levothyroxine 50 mcg daily  - TSH; Future    rtc12m    Sharif Johnston MD       chest pain/code stemi

## 2025-02-12 NOTE — ED ADULT NURSE NOTE - BREATH SOUNDS, MLM
Bed/Stretcher in lowest position, wheels locked, appropriate side rails in place/Call bell, personal items and telephone in reach/Instruct patient to call for assistance before getting out of bed/chair/stretcher/Non-slip footwear applied when patient is off stretcher/[place] to call system/Physically safe environment - no spills, clutter or unnecessary equipment/Purposeful proactive rounding/Room/bathroom lighting operational, light cord in reach
Clear